# Patient Record
Sex: MALE | Race: WHITE | NOT HISPANIC OR LATINO
[De-identification: names, ages, dates, MRNs, and addresses within clinical notes are randomized per-mention and may not be internally consistent; named-entity substitution may affect disease eponyms.]

---

## 2017-04-28 ENCOUNTER — APPOINTMENT (OUTPATIENT)
Dept: NEPHROLOGY | Facility: CLINIC | Age: 62
End: 2017-04-28

## 2017-04-28 VITALS
WEIGHT: 180 LBS | DIASTOLIC BLOOD PRESSURE: 84 MMHG | SYSTOLIC BLOOD PRESSURE: 136 MMHG | HEART RATE: 76 BPM | BODY MASS INDEX: 24.38 KG/M2 | HEIGHT: 72 IN

## 2017-04-28 DIAGNOSIS — Z88.9 ALLERGY STATUS TO UNSPECIFIED DRUGS, MEDICAMENTS AND BIOLOGICAL SUBSTANCES: ICD-10-CM

## 2017-04-28 DIAGNOSIS — Z87.09 PERSONAL HISTORY OF OTHER DISEASES OF THE RESPIRATORY SYSTEM: ICD-10-CM

## 2017-04-28 DIAGNOSIS — N40.0 BENIGN PROSTATIC HYPERPLASIA WITHOUT LOWER URINARY TRACT SYMPMS: ICD-10-CM

## 2017-04-28 RX ORDER — ZOLPIDEM TARTRATE 10 MG/1
10 TABLET ORAL
Qty: 30 | Refills: 0 | Status: ACTIVE | COMMUNITY
Start: 2017-02-23

## 2017-04-28 RX ORDER — CETIRIZINE HYDROCHLORIDE 10 MG/1
10 TABLET, FILM COATED ORAL
Refills: 0 | Status: ACTIVE | COMMUNITY
Start: 2017-04-28

## 2017-04-28 RX ORDER — MONTELUKAST 10 MG/1
10 TABLET, FILM COATED ORAL
Qty: 30 | Refills: 0 | Status: ACTIVE | COMMUNITY
Start: 2016-07-15

## 2017-04-28 RX ORDER — ATORVASTATIN CALCIUM 20 MG/1
20 TABLET, FILM COATED ORAL
Qty: 30 | Refills: 0 | Status: ACTIVE | COMMUNITY
Start: 2017-03-21

## 2017-05-12 LAB
ALBUMIN SERPL ELPH-MCNC: 4.6 G/DL
ALP BLD-CCNC: 78 U/L
ALT SERPL-CCNC: 28 U/L
ANION GAP SERPL CALC-SCNC: 14 MMOL/L
APPEARANCE: CLEAR
AST SERPL-CCNC: 22 U/L
BACTERIA: NEGATIVE
BILIRUB SERPL-MCNC: 0.4 MG/DL
BILIRUBIN URINE: NEGATIVE
BLOOD URINE: NEGATIVE
BUN SERPL-MCNC: 13 MG/DL
CALCIUM SERPL-MCNC: 10.4 MG/DL
CHLORIDE SERPL-SCNC: 101 MMOL/L
CO2 SERPL-SCNC: 26 MMOL/L
COLOR: YELLOW
CORTIS SERPL-MCNC: 13 UG/DL
CREAT SERPL-MCNC: 0.93 MG/DL
CREAT SPEC-SCNC: 117 MG/DL
GLUCOSE QUALITATIVE U: NORMAL MG/DL
GLUCOSE SERPL-MCNC: 101 MG/DL
HYALINE CASTS: 0 /LPF
KETONES URINE: NEGATIVE
LEUKOCYTE ESTERASE URINE: NEGATIVE
MICROSCOPIC-UA: NORMAL
NITRITE URINE: NEGATIVE
OSMOLALITY UR: 700 MOS/KG
PH URINE: 6.5
POTASSIUM SERPL-SCNC: 5 MMOL/L
POTASSIUM UR-SCNC: 90 MMOL/L
PROT SERPL-MCNC: 7.6 G/DL
PROTEIN URINE: NEGATIVE MG/DL
RED BLOOD CELLS URINE: 1 /HPF
SODIUM SERPL-SCNC: 141 MMOL/L
SPECIFIC GRAVITY URINE: 1.02
SQUAMOUS EPITHELIAL CELLS: 0 /HPF
URATE SERPL-MCNC: 5.4 MG/DL
UROBILINOGEN URINE: NORMAL MG/DL
WHITE BLOOD CELLS URINE: 1 /HPF

## 2019-12-04 ENCOUNTER — RECORD ABSTRACTING (OUTPATIENT)
Age: 64
End: 2019-12-04

## 2019-12-04 DIAGNOSIS — Z82.3 FAMILY HISTORY OF STROKE: ICD-10-CM

## 2019-12-04 DIAGNOSIS — Z78.9 OTHER SPECIFIED HEALTH STATUS: ICD-10-CM

## 2019-12-04 LAB
PSA FREE FLD-MCNC: 16.5
PSA FREE SERPL-MCNC: 1.5
PSA SERPL-MCNC: 9.1

## 2020-01-09 ENCOUNTER — RX RENEWAL (OUTPATIENT)
Age: 65
End: 2020-01-09

## 2020-01-17 ENCOUNTER — APPOINTMENT (OUTPATIENT)
Dept: UROLOGY | Facility: CLINIC | Age: 65
End: 2020-01-17
Payer: COMMERCIAL

## 2020-01-17 VITALS
BODY MASS INDEX: 24.38 KG/M2 | HEART RATE: 96 BPM | HEIGHT: 72 IN | DIASTOLIC BLOOD PRESSURE: 87 MMHG | WEIGHT: 180 LBS | TEMPERATURE: 98 F | SYSTOLIC BLOOD PRESSURE: 138 MMHG

## 2020-01-17 DIAGNOSIS — Z00.00 ENCOUNTER FOR GENERAL ADULT MEDICAL EXAMINATION W/OUT ABNORMAL FINDINGS: ICD-10-CM

## 2020-01-17 DIAGNOSIS — E78.5 HYPERLIPIDEMIA, UNSPECIFIED: ICD-10-CM

## 2020-01-17 LAB
BILIRUB UR QL STRIP: NORMAL
CLARITY UR: CLEAR
COLLECTION METHOD: NORMAL
GLUCOSE UR-MCNC: NORMAL
HCG UR QL: 0.2 EU/DL
HGB UR QL STRIP.AUTO: NORMAL
KETONES UR-MCNC: NORMAL
LEUKOCYTE ESTERASE UR QL STRIP: NORMAL
NITRITE UR QL STRIP: NORMAL
PH UR STRIP: 5.5
PROT UR STRIP-MCNC: NORMAL
SP GR UR STRIP: 1.02

## 2020-01-17 PROCEDURE — 76857 US EXAM PELVIC LIMITED: CPT

## 2020-01-17 PROCEDURE — 81003 URINALYSIS AUTO W/O SCOPE: CPT | Mod: QW

## 2020-01-17 PROCEDURE — 99215 OFFICE O/P EST HI 40 MIN: CPT

## 2020-01-17 RX ORDER — FLUTICASONE FUROATE, UMECLIDINIUM BROMIDE AND VILANTEROL TRIFENATATE 100; 62.5; 25 UG/1; UG/1; UG/1
100-62.5-25 POWDER RESPIRATORY (INHALATION)
Refills: 0 | Status: ACTIVE | COMMUNITY

## 2020-01-17 RX ORDER — FLUTICASONE PROPIONATE AND SALMETEROL 50; 100 UG/1; UG/1
100-50 POWDER RESPIRATORY (INHALATION)
Qty: 60 | Refills: 0 | Status: DISCONTINUED | COMMUNITY
Start: 2016-08-26 | End: 2020-01-17

## 2020-01-22 LAB — PSA SERPL-MCNC: 14.8 NG/ML

## 2020-03-20 NOTE — PHYSICAL EXAM
[FreeTextEntry1] : Asymmetrical testes (left is approximately 10cc).  Tender, scarred left spermatic cord, consistent with prior surgery.

## 2020-03-20 NOTE — ASSESSMENT
[FreeTextEntry1] : I discussed the findings and options with . ANALIA GORDILLO in detail.  He will obtain another prostate MRI in view of his longstanding PSA elevation (albeit unchanged) and his family history of prostate cancer. I will also call him with the current PSA result.\par \par Regarding his voiding symptoms, we discussed other options (e.g. adding finasteride, surgical therapies), but he will simply continue on alfuzosin. Mr. Gordillo will decide if he wants to continue with daily Cialis.\par \par Finally, the ED will continue to be managed with Cialis 1/2 20mg prn.\par \par I look forward to seeing Mr. Gordillo in one year providing the pending test results are satisfactory.\par

## 2020-03-20 NOTE — HISTORY OF PRESENT ILLNESS
[FreeTextEntry1] : Mr. ANALIA GORDILLO comes in today for a urologic evaluation.  He presents with a several month onset of worsening LUTS (obstructive and irritative) and nocturia a x2. He has had rare episodes of urge incontinence.  He has been on alfuzosin since Dec 2017.  Three months ago he was started on daily Cialis.  In 2019 he underwent an evaluation of gross painless hematuria, which was negative. He has also had two prostate biopsies for elevated PSAs.\par IPSS:  22/35\par Sono:  67cc PVR; 47cc prostate\par \par He also has a several year onset of ED for which he uses Cialis 20mg prn.\par \par PSAs:  1/28/09--1.76; 2/11/10--3.89;  11/20/10--5.1; 6/8/11--6.02; 7/11/12--5.5; 4/25/14--3.02; 4/13/15--3.85; 12/10/15--14.7; 1/19/17--8.05; 12/7/17--9.7; 5/19/18--8.9 (17%); 12/6/18--9.2 (15%)\par \par Prostate bxs: 6/19/10--BPH; 2/19/16--BPH \par \par MRI prostate:  12/22/17--PIRADS 2\par \par CT abd/pelvis:  1/16/19--sigmoid diverticulosis\par Cysto: 2/1/19--No significant pathology

## 2020-07-07 ENCOUNTER — APPOINTMENT (OUTPATIENT)
Dept: UROLOGY | Facility: CLINIC | Age: 65
End: 2020-07-07
Payer: COMMERCIAL

## 2020-07-07 VITALS
WEIGHT: 180 LBS | DIASTOLIC BLOOD PRESSURE: 82 MMHG | HEIGHT: 72 IN | TEMPERATURE: 98.2 F | SYSTOLIC BLOOD PRESSURE: 136 MMHG | BODY MASS INDEX: 24.38 KG/M2

## 2020-07-07 LAB
BILIRUB UR QL STRIP: NORMAL
CLARITY UR: CLEAR
COLLECTION METHOD: NORMAL
GLUCOSE UR-MCNC: NORMAL
HCG UR QL: 0.2 EU/DL
HGB UR QL STRIP.AUTO: NORMAL
KETONES UR-MCNC: NORMAL
LEUKOCYTE ESTERASE UR QL STRIP: NORMAL
NITRITE UR QL STRIP: NORMAL
PH UR STRIP: 5.5
PROT UR STRIP-MCNC: NORMAL
SP GR UR STRIP: 1.03

## 2020-07-07 PROCEDURE — 76857 US EXAM PELVIC LIMITED: CPT

## 2020-07-07 PROCEDURE — 81003 URINALYSIS AUTO W/O SCOPE: CPT | Mod: QW

## 2020-07-07 PROCEDURE — 99215 OFFICE O/P EST HI 40 MIN: CPT | Mod: 25

## 2020-07-07 NOTE — PHYSICAL EXAM
[General Appearance - Well Nourished] : well nourished [Normal Appearance] : normal appearance [General Appearance - Well Developed] : well developed [Abdomen Soft] : soft [General Appearance - In No Acute Distress] : no acute distress [Well Groomed] : well groomed [Abdomen Tenderness] : non-tender [Abdomen Mass (___ Cm)] : no abdominal mass palpated [Costovertebral Angle Tenderness] : no ~M costovertebral angle tenderness [Abdomen Hernia] : no hernia was discovered [Penis Abnormality] : normal circumcised penis [Urethral Meatus] : meatus normal [Scrotum] : the scrotum was normal [Urinary Bladder Findings] : the bladder was normal on palpation [Testes Mass (___cm)] : there were no testicular masses [Testes Tenderness] : no tenderness of the testes [No Prostate Nodules] : no prostate nodules [Skin Color & Pigmentation] : normal skin color and pigmentation [Edema] : no peripheral edema [Heart Rate And Rhythm] : Heart rate and rhythm were normal [Exaggerated Use Of Accessory Muscles For Inspiration] : no accessory muscle use [] : no respiratory distress [Respiration, Rhythm And Depth] : normal respiratory rhythm and effort [Oriented To Time, Place, And Person] : oriented to person, place, and time [Affect] : the affect was normal [Mood] : the mood was normal [Not Anxious] : not anxious [Normal Station and Gait] : the gait and station were normal for the patient's age [No Focal Deficits] : no focal deficits [No Palpable Adenopathy] : no palpable adenopathy [FreeTextEntry1] : Asymmetrical testes (left is approximately 10cc).  Tender, scarred left spermatic cord, consistent with prior surgery.

## 2020-07-07 NOTE — ASSESSMENT
[FreeTextEntry1] : I discussed the findings and options with . ANALIA GORDILLO in detail.\par He will decide if he wants to proceed with another prostate biopsy, as advised because of the persistently elevated PSA and abnormal PSAD.  Although the multi-parametric prostate MRI is without any evident lesions, since this will be his third biopsy, I recommended that he have this done transperineally in order to access the central zone.  If he decides to proceed with this, I referred him to Dr. Espinoza in our office.  I again reviewed the prostate biopsy procedures with him including the risks and benefits.

## 2020-07-07 NOTE — HISTORY OF PRESENT ILLNESS
[FreeTextEntry1] : Mr. ANALIA GORDILLO comes in today for a urologic followup.  He presents with a several month onset of worsening LUTS (obstructive and irritative) and nocturia a x2. He also has rare episodes of urge incontinence.  He is currently on alfuzosin and daily Cialis. In 2019 he underwent an evaluation for gross painless hematuria, which was negative. He has also had two prostate biopsies for elevated PSAs (see below)\par IPSS: 19/35\par Sono:  32cc PVR; 56cc prostate\par (PSAD: 0.195)\par \par Mr. Gordillo has a several year onset of ED for which he uses Cialis 20mg prn.\par \par PSAs:  1/28/09--1.76; 2/11/10--3.89;  11/20/10--5.1; 6/8/11--6.02; 7/11/12--5.5; 4/25/14--3.02; 4/13/15--3.85; 12/10/15--14.7; 1/19/17--8.05; 12/7/17--9.7; 5/19/18--8.9 (17%); 12/6/18--9.2 (15%); 1/17/20--14.8; 3/14/20--14.7; 6/24/20--12.7\par \par Prostate bxs: 6/19/10--BPH; 2/19/16--BPH \par \par MRI prostate:  12/22/17--PIRADS 2; 6/11/20--Normal study (65cc prostate)\par \par CT abd/pelvis:  1/16/19--sigmoid diverticulosis\par Cysto: 2/1/19--No significant pathology

## 2020-10-19 ENCOUNTER — NON-APPOINTMENT (OUTPATIENT)
Age: 65
End: 2020-10-19

## 2020-12-28 ENCOUNTER — APPOINTMENT (OUTPATIENT)
Dept: UROLOGY | Facility: CLINIC | Age: 65
End: 2020-12-28
Payer: COMMERCIAL

## 2020-12-28 VITALS
SYSTOLIC BLOOD PRESSURE: 119 MMHG | TEMPERATURE: 98 F | OXYGEN SATURATION: 98 % | DIASTOLIC BLOOD PRESSURE: 85 MMHG | HEART RATE: 88 BPM

## 2020-12-28 DIAGNOSIS — J45.909 UNSPECIFIED ASTHMA, UNCOMPLICATED: ICD-10-CM

## 2020-12-28 LAB
BILIRUB UR QL STRIP: NORMAL
CLARITY UR: CLEAR
COLLECTION METHOD: NORMAL
GLUCOSE UR-MCNC: NORMAL
HCG UR QL: 0.2 EU/DL
HGB UR QL STRIP.AUTO: NORMAL
KETONES UR-MCNC: NORMAL
LEUKOCYTE ESTERASE UR QL STRIP: NORMAL
NITRITE UR QL STRIP: NORMAL
PH UR STRIP: 5.5
PROT UR STRIP-MCNC: NORMAL
SP GR UR STRIP: 1.01

## 2020-12-28 PROCEDURE — 76857 US EXAM PELVIC LIMITED: CPT

## 2020-12-28 PROCEDURE — 99072 ADDL SUPL MATRL&STAF TM PHE: CPT

## 2020-12-28 PROCEDURE — 99214 OFFICE O/P EST MOD 30 MIN: CPT | Mod: 25

## 2020-12-28 PROCEDURE — 81003 URINALYSIS AUTO W/O SCOPE: CPT | Mod: QW

## 2020-12-28 RX ORDER — TADALAFIL 5 MG/1
TABLET, FILM COATED ORAL
Refills: 0 | Status: DISCONTINUED | COMMUNITY
End: 2020-12-28

## 2020-12-28 NOTE — HISTORY OF PRESENT ILLNESS
[FreeTextEntry1] : Mr. ANALIA GORDILLO comes in today for a urologic followup.  He presents with moderate relatively stable urinary tract symptoms (obstructive and irritative) and nocturia x 2. He also has rare instances of urge incontinence. Approximately 10 days ago he experienced a 2 day episode of increasing obstructive-type symptoms, which resolved spontaneously.  He reports a similar episode one year ago--both possibly related to increased alcohol intake.  Mr. Gordillo is continuing on alfuzosin and daily Cialis.\par IPSS: 22/35\par Sono: 25cc PVR; 66cc prostate\par \par In 2019 he underwent an evaluation for gross painless hematuria, which was negative. He has also had two prostate biopsies for elevated PSAs (see below).\par \par Mr. Gordillo has a several year onset of ED for which he uses Cialis 20mg prn.\par \par PSAs:  1/28/09--1.76; 2/11/10--3.89;  11/20/10--5.1; 6/8/11--6.02; 7/11/12--5.5; 4/25/14--3.02; 4/13/15--3.85; 12/10/15--14.7; 1/19/17--8.05; 12/7/17--9.7; 5/19/18--8.9 (17%); 12/6/18--9.2 (15%); 1/17/20--14.8; 3/14/20--14.7; 6/24/20--12.7; 10/2/20--11.3 (PSAD:  0.17)\par \par Prostate bxs: 2/19/16--BPH; 6/19/10--BPH \par \par MRI prostate:  6/11/20--Normal study (65cc prostate); 12/22/17--PIRADS 2 \par \par CT abd/pelvis:  1/16/19--sigmoid diverticulosis\par Cysto: 2/1/19--No significant pathology

## 2020-12-28 NOTE — ASSESSMENT
[FreeTextEntry1] : I discussed the findings and options with Mr. ANALIA GORDILLO in detail.\par He does not want any additional pharmacologic intervention (i.e. a 5-alpha reductase inhibitor) but will continue on  alfuzosin and daily Cialis for his voiding symptoms.\par \par Similarly, Mr. Gordillo wants to forego another prostate biopsy at this time.\par \par Regarding the erectile dysfunction, he will continue with Cialis 20 mg as needed.\par \par Providing the PSA remains stable I would like to see Mr. Gordillo in 6 months. (bladder sono, PSA).\par

## 2020-12-30 ENCOUNTER — NON-APPOINTMENT (OUTPATIENT)
Age: 65
End: 2020-12-30

## 2020-12-30 LAB — PSA SERPL-MCNC: 15.4 NG/ML

## 2021-05-05 ENCOUNTER — RX RENEWAL (OUTPATIENT)
Age: 66
End: 2021-05-05

## 2021-06-04 ENCOUNTER — RX RENEWAL (OUTPATIENT)
Age: 66
End: 2021-06-04

## 2021-06-24 ENCOUNTER — NON-APPOINTMENT (OUTPATIENT)
Age: 66
End: 2021-06-24

## 2021-07-02 ENCOUNTER — RX RENEWAL (OUTPATIENT)
Age: 66
End: 2021-07-02

## 2021-07-12 ENCOUNTER — APPOINTMENT (OUTPATIENT)
Dept: UROLOGY | Facility: CLINIC | Age: 66
End: 2021-07-12
Payer: COMMERCIAL

## 2021-07-12 PROCEDURE — 76857 US EXAM PELVIC LIMITED: CPT

## 2021-07-12 PROCEDURE — 99072 ADDL SUPL MATRL&STAF TM PHE: CPT

## 2021-07-12 PROCEDURE — 99215 OFFICE O/P EST HI 40 MIN: CPT

## 2021-07-12 NOTE — HISTORY OF PRESENT ILLNESS
[FreeTextEntry1] : Mr. ANALIA GORDILLO comes in today for a urologic followup.  Approximately 3 weeks ago he noted gross painless hematuria noted on two urinations. In 2019 he underwent an evaluation for gross painless hematuria, which was negative. He has also had two prostate biopsies for elevated PSAs (see below).\par \par From his general urologic history, Mr. Gordillo reports moderate relatively stable urinary tract symptoms (obstructive and irritative) and nocturia x 2. He also has rare instances of urge incontinence. Approximately 10 days ago he experienced a 2 day episode of increasing obstructive-type symptoms, which resolved spontaneously.  He reports a similar episode one year ago--both possibly related to increased alcohol intake.  Mr. Gordillo is continuing on alfuzosin and daily Cialis.\par IPSS: 20/35\par Sono: 103cc PVR; 74cc prostate\par \par Mr. Gordillo has a several year onset of ED for which he uses Cialis 20mg prn.\par \par PSAs:  1/28/09--1.76; 2/11/10--3.89;  11/20/10--5.1; 6/8/11--6.02; 7/11/12--5.5; 4/25/14--3.02; 4/13/15--3.85; 12/10/15--14.7; 1/19/17--8.05; 12/7/17--9.7; 5/19/18--8.9 (17%); 12/6/18--9.2 (15%); 1/17/20--14.8; 3/14/20--14.7; 6/24/20--12.7; 10/2/20--11.3 (PSAD:  0.17); 5/26/21--14.5; \par \par Prostate bxs: 2/19/16--BPH; 6/19/10--BPH \par \par MRI prostate:  6/11/20--Normal study (65cc prostate); 12/22/17--PIRADS 2 \par \par CT abd/pelvis:  1/16/19--sigmoid diverticulosis\par Cysto: 2/1/19--No significant pathology

## 2021-07-12 NOTE — ASSESSMENT
[FreeTextEntry1] : I discussed the findings and options with . ANALIA GORDILLO in detail. He will proceed with a CT abd/pelvis and return for an in-office cystoscopy. \par \par Regarding his voding symptoms, he will continue with daily alfuzosin and Cialis (5mg).  Additionally, he will use 20mg Cialis prn.\par \par I would like Mr. Gordillo to repeat a PSA in 4 months and I will call him with that result.

## 2021-07-12 NOTE — PHYSICAL EXAM
[General Appearance - Well Developed] : well developed [General Appearance - Well Nourished] : well nourished [Normal Appearance] : normal appearance [Well Groomed] : well groomed [General Appearance - In No Acute Distress] : no acute distress [Abdomen Soft] : soft [Abdomen Tenderness] : non-tender [Abdomen Mass (___ Cm)] : no abdominal mass palpated [Abdomen Hernia] : no hernia was discovered [Costovertebral Angle Tenderness] : no ~M costovertebral angle tenderness [Urethral Meatus] : meatus normal [Penis Abnormality] : normal circumcised penis [Urinary Bladder Findings] : the bladder was normal on palpation [Scrotum] : the scrotum was normal [Testes Tenderness] : no tenderness of the testes [Testes Mass (___cm)] : there were no testicular masses [No Prostate Nodules] : no prostate nodules [Skin Color & Pigmentation] : normal skin color and pigmentation [Heart Rate And Rhythm] : Heart rate and rhythm were normal [Edema] : no peripheral edema [] : no respiratory distress [Respiration, Rhythm And Depth] : normal respiratory rhythm and effort [Exaggerated Use Of Accessory Muscles For Inspiration] : no accessory muscle use [Oriented To Time, Place, And Person] : oriented to person, place, and time [Affect] : the affect was normal [Mood] : the mood was normal [Not Anxious] : not anxious [Normal Station and Gait] : the gait and station were normal for the patient's age [No Focal Deficits] : no focal deficits [No Palpable Adenopathy] : no palpable adenopathy [Prostate Tenderness] : the prostate was not tender [FreeTextEntry1] : Asymmetrical testes (left is approximately 5-10cc).  Tender, scarred left spermatic cord, consistent with prior surgery.

## 2021-07-12 NOTE — ADDENDUM
[FreeTextEntry1] : A portion of this note was written by [Christiano Healy] on 07/09/2021 acting as a scribe for Dr. Elias. \par \par I have personally reviewed the chart and agree that the record accurately reflects my personal performance of the history, physical exam, assessment, and plan.

## 2021-07-13 LAB — URINE CYTOLOGY: NORMAL

## 2021-07-14 LAB — BACTERIA UR CULT: NORMAL

## 2021-07-15 ENCOUNTER — APPOINTMENT (OUTPATIENT)
Dept: UROLOGY | Facility: CLINIC | Age: 66
End: 2021-07-15
Payer: COMMERCIAL

## 2021-07-15 PROCEDURE — 99214 OFFICE O/P EST MOD 30 MIN: CPT | Mod: 25

## 2021-07-15 PROCEDURE — 52000 CYSTOURETHROSCOPY: CPT

## 2021-07-15 NOTE — ADDENDUM
[FreeTextEntry1] : A portion of this note was written by [Christiano Healy] on 07/14/2021 acting as a scribe for Dr. Elias. \par \par I have personally reviewed the chart and agree that the record accurately reflects my personal performance of the history, physical exam, assessment, and plan.

## 2021-07-15 NOTE — HISTORY OF PRESENT ILLNESS
[FreeTextEntry1] : Mr. ANALIA GORDILLO comes in today for a urologic followup and is scheduled for a cystoscopy for evaluation of gross painless hematuria noted on two urinations approximately 3 weeks ago.  He denies renal colic.\par \par From his general urologic history, Mr. Gordillo reports moderate relatively stable urinary tract symptoms (obstructive and irritative) and nocturia x 2. He also has rare instances of urge incontinence. Approximately 10 days ago he experienced a 2 day episode of increasing obstructive-type symptoms, which resolved spontaneously.  He reports a similar episode one year ago--both possibly related to increased alcohol intake.  Mr. Goridllo is continuing on alfuzosin and daily Cialis.\par \par Mr. Gordillo has a several year onset of ED for which he uses Cialis 20mg prn.\par \par PSAs:  1/28/09--1.76; 2/11/10--3.89;  11/20/10--5.1; 6/8/11--6.02; 7/11/12--5.5; 4/25/14--3.02; 4/13/15--3.85; 12/10/15--14.7; 1/19/17--8.05; 12/7/17--9.7; 5/19/18--8.9 (17%); 12/6/18--9.2 (15%); 1/17/20--14.8; 3/14/20--14.7; 6/24/20--12.7; 10/2/20--11.3 (PSAD:  0.17); 5/26/21--14.5; \par \par Prostate bxs: 2/19/16--BPH; 6/19/10--BPH \par \par MRI prostate:  6/11/20--Normal study (65cc prostate); 12/22/17--PIRADS 2 \par \par CT abd/pelvis:  7/13/21--Punctate right renal calculus measuring 2mm. Hepatic cyst; 1/16/19--sigmoid diverticulosis\par Cysto: 2/1/19--No significant pathology

## 2021-07-15 NOTE — PHYSICAL EXAM
[General Appearance - Well Developed] : well developed [General Appearance - Well Nourished] : well nourished [Normal Appearance] : normal appearance [Well Groomed] : well groomed [General Appearance - In No Acute Distress] : no acute distress [Abdomen Soft] : soft [Abdomen Tenderness] : non-tender [Abdomen Mass (___ Cm)] : no abdominal mass palpated [Urethral Meatus] : meatus normal [Penis Abnormality] : normal circumcised penis [Urinary Bladder Findings] : the bladder was normal on palpation [Scrotum] : the scrotum was normal [] : no respiratory distress [Respiration, Rhythm And Depth] : normal respiratory rhythm and effort [Exaggerated Use Of Accessory Muscles For Inspiration] : no accessory muscle use [Oriented To Time, Place, And Person] : oriented to person, place, and time [Affect] : the affect was normal [Mood] : the mood was normal [Not Anxious] : not anxious [Normal Station and Gait] : the gait and station were normal for the patient's age [Testes Tenderness] : no tenderness of the testes [Testes Mass (___cm)] : there were no testicular masses [FreeTextEntry1] : Asymmetrical testes (left is approximately 5-10cc).  Tender, scarred left spermatic cord, consistent with prior surgery.

## 2021-07-15 NOTE — ASSESSMENT
[FreeTextEntry1] : I discussed the findings and options with . ANALIA GORDILLO in detail and reviewed the CT results with him.  Fortunately, today's cystoscopy was negative with the likely etiology of the bleeding.  If this persists, we can consider adding finasteride to the daily alfuzosin and Cialis.\par \par Going forward, I recommended that he return electively in 1 year (bladder and renal sonograms, PSA).\par \par I would also like Mr. Gordillo to repeat a PSA in 4 months and I will call him with that result.

## 2021-07-19 LAB — BACTERIA UR CULT: NORMAL

## 2021-09-17 ENCOUNTER — NON-APPOINTMENT (OUTPATIENT)
Age: 66
End: 2021-09-17

## 2021-10-14 ENCOUNTER — NON-APPOINTMENT (OUTPATIENT)
Age: 66
End: 2021-10-14

## 2021-10-26 ENCOUNTER — APPOINTMENT (OUTPATIENT)
Dept: UROLOGY | Facility: CLINIC | Age: 66
End: 2021-10-26
Payer: COMMERCIAL

## 2021-10-26 VITALS — SYSTOLIC BLOOD PRESSURE: 124 MMHG | HEART RATE: 114 BPM | TEMPERATURE: 98 F | DIASTOLIC BLOOD PRESSURE: 79 MMHG

## 2021-10-26 PROCEDURE — 99214 OFFICE O/P EST MOD 30 MIN: CPT

## 2021-10-26 NOTE — ASSESSMENT
[FreeTextEntry1] : 66 year old with family history of prostate cancer, elevated PSA up to 16 in 9/2021, two negative prior prostate biopsies and multiple negative prostate MRIs. His most recent MRI in 10/2021 showed a 86 cc prostate with no concerning lesions. \par \par Prostate cancer screening: the patient and I spoke at length about prostate cancer screening, its risks and its benefits. The patient has several risk factors for prostate cancer, including elevated PSA and strong family history.  He understands that many men with prostate cancer will die with the disease rather than of it and we also discussed the results large multi-center [place] and  prostate cancer screening trials. He also understands that PSA in and of itself does not diagnose prostate cancer but only assesses risk to a certain degree. The patient understands that to definitively screen for prostate cancer, a biopsy is required and this procedure has risks, including bleeding, infection, ED and urinary retention.  The patient opted to move forward with the biopsy, which is performed via a transperineal approach in the office.\par \par The patient is aware to expect hematuria x 2 weeks and upto 4 weeks of hematospermia.  There is a risk of infection albeit much lower than a transrectal approach. In some cases patients can experience erectile dysfunction but this is usually self limiting.  Any fever/chills after the biopsy the patient is to contact the office and go to the ER for an immediate evaluation. He has been given paper instructions outlining these items - which includes medications to avoid prior to surgery.\par \par 1. TP prostate biopsy in the office\par 2. Enema the night before\par

## 2021-10-26 NOTE — HISTORY OF PRESENT ILLNESS
[FreeTextEntry1] : Mr. ANALIA GORDILLO comes in today to discuss prostate biopsy. \par \par From his general urologic history, Mr. Gordillo reports moderate relatively stable urinary tract symptoms (obstructive and irritative) and nocturia x 2. He also has rare instances of urge incontinence.  Mr. Gordillo is continuing on alfuzosin and daily Cialis.\par \par Mr. Gordillo has a several year onset of ED for which he uses Cialis 20mg prn.\par \par PSAs: 1/28/09--1.76; 2/11/10--3.89; 11/20/10--5.1; 6/8/11--6.02; 7/11/12--5.5; 4/25/14--3.02; 4/13/15--3.85; 12/10/15--14.7; 1/19/17--8.05; 12/7/17--9.7; 5/19/18--8.9 (17%); 12/6/18--9.2 (15%); 1/17/20--14.8; 3/14/20--14.7; 6/24/20--12.7; 10/2/20--11.3 (PSAD: 0.17); 5/26/21--14.5; 9/17/21--16.04\par \par Prostate bxs: 2/19/16--BPH; 6/19/10--BPH \par \par MRI prostate: 6/11/20--Normal study (65cc prostate); 12/22/17--PIRADS 2 \par 10/8/21: 86 cc prostate, no concerning lesions\par \par CT abd/pelvis: 7/13/21--Punctate right renal calculus measuring 2mm. Hepatic cyst; 1/16/19--sigmoid diverticulosis\par Cysto: 2/1/19--No significant pathology

## 2021-11-08 ENCOUNTER — APPOINTMENT (OUTPATIENT)
Dept: UROLOGY | Facility: CLINIC | Age: 66
End: 2021-11-08
Payer: COMMERCIAL

## 2021-11-08 VITALS — SYSTOLIC BLOOD PRESSURE: 152 MMHG | TEMPERATURE: 98 F | DIASTOLIC BLOOD PRESSURE: 70 MMHG | HEART RATE: 59 BPM

## 2021-11-08 PROCEDURE — 76872 US TRANSRECTAL: CPT

## 2021-11-08 PROCEDURE — 55700: CPT

## 2021-11-19 LAB — CORE LAB BIOPSY: NORMAL

## 2022-01-10 ENCOUNTER — APPOINTMENT (OUTPATIENT)
Dept: UROLOGY | Facility: CLINIC | Age: 67
End: 2022-01-10

## 2022-02-14 ENCOUNTER — APPOINTMENT (OUTPATIENT)
Dept: UROLOGY | Facility: CLINIC | Age: 67
End: 2022-02-14
Payer: COMMERCIAL

## 2022-02-14 VITALS
WEIGHT: 180 LBS | TEMPERATURE: 98.2 F | SYSTOLIC BLOOD PRESSURE: 151 MMHG | HEART RATE: 97 BPM | HEIGHT: 72 IN | DIASTOLIC BLOOD PRESSURE: 85 MMHG | RESPIRATION RATE: 14 BRPM | BODY MASS INDEX: 24.38 KG/M2

## 2022-02-14 DIAGNOSIS — Z87.448 PERSONAL HISTORY OF OTHER DISEASES OF URINARY SYSTEM: ICD-10-CM

## 2022-02-14 LAB
BILIRUB UR QL STRIP: NORMAL
CLARITY UR: CLEAR
COLLECTION METHOD: NORMAL
GLUCOSE UR-MCNC: NORMAL
HCG UR QL: 0.2 EU/DL
HGB UR QL STRIP.AUTO: NORMAL
KETONES UR-MCNC: NORMAL
LEUKOCYTE ESTERASE UR QL STRIP: NORMAL
NITRITE UR QL STRIP: NORMAL
PH UR STRIP: 5.5
PROT UR STRIP-MCNC: NORMAL
SP GR UR STRIP: >1.03

## 2022-02-14 PROCEDURE — 81003 URINALYSIS AUTO W/O SCOPE: CPT | Mod: QW

## 2022-02-14 PROCEDURE — 76857 US EXAM PELVIC LIMITED: CPT

## 2022-02-14 PROCEDURE — 99215 OFFICE O/P EST HI 40 MIN: CPT

## 2022-02-15 ENCOUNTER — NON-APPOINTMENT (OUTPATIENT)
Age: 67
End: 2022-02-15

## 2022-02-15 LAB — PSA SERPL-MCNC: 17.5 NG/ML

## 2022-02-15 NOTE — ASSESSMENT
[FreeTextEntry1] : I discussed the findings and options with Mr. ANALIA GORDILLO in detail.  He does not want any additional pharmacologic (e.g. 5aRI) or any surgical intervention for his stable voiding symptoms.  I discussed the various prostatic reductive surgical procedures with him (including the gold standard TURP, laser prostatectomy [HoLEP, Greenlight], urethral lift [Uro-Lift], water vapor thermal therapy [Rezum], aquablation [AquaBeam Robotic System]). For now, he will continue with alfuzosin and daily Cialis.\par \par Mr. Gordillo will also keep using Cialis 20mg prn for his ED, which is well controlled.\par \par Providing the PSA remains stable, I look forward to seeing Mr. Gordillo in one year (bladder sono, PSA, renal sono).

## 2022-02-15 NOTE — PHYSICAL EXAM
[Abdomen Hernia] : no hernia was discovered [Costovertebral Angle Tenderness] : no ~M costovertebral angle tenderness [Prostate Tenderness] : the prostate was not tender [No Prostate Nodules] : no prostate nodules [Skin Color & Pigmentation] : normal skin color and pigmentation [Heart Rate And Rhythm] : Heart rate and rhythm were normal [No Focal Deficits] : no focal deficits [No Palpable Adenopathy] : no palpable adenopathy [FreeTextEntry1] : Asymmetrical testes (left is approximately 5-10cc).  Tender, scarred left spermatic cord, consistent with prior surgery.

## 2022-02-15 NOTE — ADDENDUM
[FreeTextEntry1] : A portion of this note was written by [Christiano Healy] on 01/03/2022 acting as a scribe for Dr. Elias. \par \par I have personally reviewed the chart and agree that the record accurately reflects my personal performance of the history, physical exam, assessment, and plan.

## 2022-02-15 NOTE — HISTORY OF PRESENT ILLNESS
[FreeTextEntry1] : Mr. ANALIA GORDILLO comes in today for a urologic followup. A transperineal prostate biopsy performed  on October 26, 2021 was negative (see below). He has had several episodes of hematospermia, since.\par \par From his general urologic history, Mr. Gordillo reports moderate relatively stable urinary tract symptoms (obstructive and irritative) and nocturia x 2. He also has rare instances of urge incontinence.  He has had a few episodes of transient increases in the obstructive component of his urination, which resolved spontaneously. He is continuing on alfuzosin and daily Cialis 5mg.\par IPSS: 22/35\par Sono:  69cc PVR; 95cc prostate\par \par Mr. Goridllo has a several year onset of ED for which he uses Cialis 20mg prn.\par \par He was found to have urolithiasis on a routine CT, but has remained asymptomatic.\par \par PSAs:  1/28/09--1.76; 2/11/10--3.89;  11/20/10--5.1; 6/8/11--6.02; 7/11/12--5.5; 4/25/14--3.02; 4/13/15--3.85; 12/10/15--14.7; 1/19/17--8.05; 12/7/17--9.7; 5/19/18--8.9 (17%); 12/6/18--9.2 (15%); 1/17/20--14.8; 3/14/20--14.7; 6/24/20--12.7; 10/2/20--11.3 (PSAD:  0.17); 5/26/21--14.5; \par \par Prostate bxs: 11/19/21--BPH; 2/19/16--BPH; 6/19/10--BPH \par \par MRI prostate:  6/11/20--Normal study (65cc prostate); 12/22/17--PIRADS 2 \par \par CT abd/pelvis:  7/13/21--Punctate right renal calculus measuring 2mm. Hepatic cyst; 1/16/19--sigmoid diverticulosis\par Cysto: 2/1/19--No significant pathology

## 2022-05-04 ENCOUNTER — NON-APPOINTMENT (OUTPATIENT)
Age: 67
End: 2022-05-04

## 2022-05-05 ENCOUNTER — NON-APPOINTMENT (OUTPATIENT)
Age: 67
End: 2022-05-05

## 2022-05-09 ENCOUNTER — APPOINTMENT (OUTPATIENT)
Dept: UROLOGY | Facility: CLINIC | Age: 67
End: 2022-05-09
Payer: COMMERCIAL

## 2022-05-09 VITALS
HEART RATE: 80 BPM | BODY MASS INDEX: 24.38 KG/M2 | DIASTOLIC BLOOD PRESSURE: 80 MMHG | RESPIRATION RATE: 18 BRPM | TEMPERATURE: 98.2 F | SYSTOLIC BLOOD PRESSURE: 130 MMHG | WEIGHT: 180 LBS | HEIGHT: 72 IN

## 2022-05-09 LAB
BILIRUB UR QL STRIP: NORMAL
CLARITY UR: CLEAR
COLLECTION METHOD: NORMAL
GLUCOSE UR-MCNC: NORMAL
HCG UR QL: 0.2 EU/DL
HGB UR QL STRIP.AUTO: NORMAL
KETONES UR-MCNC: NORMAL
LEUKOCYTE ESTERASE UR QL STRIP: NORMAL
NITRITE UR QL STRIP: NORMAL
PH UR STRIP: 7
PROT UR STRIP-MCNC: NORMAL
SP GR UR STRIP: 1.02

## 2022-05-09 PROCEDURE — 81003 URINALYSIS AUTO W/O SCOPE: CPT | Mod: QW

## 2022-05-09 PROCEDURE — 76857 US EXAM PELVIC LIMITED: CPT

## 2022-05-09 PROCEDURE — 99214 OFFICE O/P EST MOD 30 MIN: CPT

## 2022-05-09 NOTE — PHYSICAL EXAM
[General Appearance - Well Developed] : well developed [General Appearance - Well Nourished] : well nourished [Normal Appearance] : normal appearance [Well Groomed] : well groomed [General Appearance - In No Acute Distress] : no acute distress [Abdomen Soft] : soft [Abdomen Tenderness] : non-tender [Abdomen Mass (___ Cm)] : no abdominal mass palpated [Abdomen Hernia] : no hernia was discovered [Costovertebral Angle Tenderness] : no ~M costovertebral angle tenderness [Urethral Meatus] : meatus normal [Penis Abnormality] : normal circumcised penis [Urinary Bladder Findings] : the bladder was normal on palpation [Scrotum] : the scrotum was normal [Testes Tenderness] : no tenderness of the testes [Testes Mass (___cm)] : there were no testicular masses [Prostate Tenderness] : the prostate was not tender [No Prostate Nodules] : no prostate nodules [Skin Color & Pigmentation] : normal skin color and pigmentation [Heart Rate And Rhythm] : Heart rate and rhythm were normal [] : no respiratory distress [Respiration, Rhythm And Depth] : normal respiratory rhythm and effort [Exaggerated Use Of Accessory Muscles For Inspiration] : no accessory muscle use [Oriented To Time, Place, And Person] : oriented to person, place, and time [Affect] : the affect was normal [Not Anxious] : not anxious [Mood] : the mood was normal [Normal Station and Gait] : the gait and station were normal for the patient's age [No Focal Deficits] : no focal deficits [No Palpable Adenopathy] : no palpable adenopathy [FreeTextEntry1] : Asymmetrical testes (left is approximately 5-10cc).  Tender, scarred left spermatic cord, consistent with prior surgery.

## 2022-05-09 NOTE — HISTORY OF PRESENT ILLNESS
[FreeTextEntry1] : Mr. ANALIA GORDILLO comes in today for a urologic followup.  For the past month he had been experiencing an exacerbation of the obstructive component of his voiding symptoms, which seems to have resolved.  He is now back to his baseline urination and awakens 2x at night to void. He has had similar exacerbations in the past, lasting only several days. He is continuing on alfuzosin and daily Cialis 5mg.\par IPSS: 26/35\par Sono (performed to assess bladder emptying): 122cc PVR; 103cc prostate\par (69cc PVR Feb 2022)\par \par He has longstanding PSA elevations, with  3 negative prostate biopsies. Since the last biopsy in October 2021, he has had recurrent hematospermia. \par \par Mr. Gordillo has a several year onset of ED for which he uses Cialis 20mg prn.\par \par He was found to have urolithiasis on a routine CT, but has remained asymptomatic.\par \par PSAs:  1/28/09--1.76; 2/11/10--3.89;  11/20/10--5.1; 6/8/11--6.02; 7/11/12--5.5; 4/25/14--3.02; 4/13/15--3.85; 12/10/15--14.7; 1/19/17--8.05; 12/7/17--9.7; 5/19/18--8.9 (17%); 12/6/18--9.2 (15%); 1/17/20--14.8; 3/14/20--14.7; 6/24/20--12.7; 10/2/20--11.3 (PSAD:  0.17); 5/26/21--14.5; 2/14/22--17.5; 4/4/22--14.37\par \par Prostate bxs: 11/19/21--BPH; 2/19/16--BPH; 6/19/10--BPH \par \par MRI prostate: 10/8/21--PIRADS 2. 86cc prostate;  6/11/20--Normal study (65cc prostate); 12/22/17--PIRADS 2 \par \par CT abd/pelvis:  7/13/21--Punctate right renal calculus measuring 2mm. Hepatic cyst; 1/16/19--sigmoid diverticulosis\par Cysto: 2/1/19--No significant pathology

## 2022-05-09 NOTE — ASSESSMENT
[FreeTextEntry1] : I discussed the findings and options with Mr. ANALIA GORDILLO in detail.  Because of the spontaneous improvement in his urination, Mr. Gordillo does not want to consider adding a 5aRI or any surgical intervention at this time.  He may try increasing the alfuzosin to bid, while maintaining the daily Cialis.  He should monitor his BP when he is on the higher a1 blocker dose. \par \par Mr. Gordillo will also keep using Cialis 20mg prn for his ED, which is well controlled.\par \par We agreed that he would repeat a PSA in 4 months and return, electively, in 6 months (bladder sono, ? PSA).

## 2022-10-17 ENCOUNTER — APPOINTMENT (OUTPATIENT)
Dept: UROLOGY | Facility: CLINIC | Age: 67
End: 2022-10-17

## 2022-10-17 PROCEDURE — 99214 OFFICE O/P EST MOD 30 MIN: CPT | Mod: 25

## 2022-10-17 PROCEDURE — 81003 URINALYSIS AUTO W/O SCOPE: CPT | Mod: QW

## 2022-10-17 PROCEDURE — 76857 US EXAM PELVIC LIMITED: CPT

## 2022-10-17 RX ORDER — ALFUZOSIN HYDROCHLORIDE 10 MG/1
10 TABLET, EXTENDED RELEASE ORAL TWICE DAILY
Qty: 180 | Refills: 3 | Status: ACTIVE | COMMUNITY
Start: 2020-01-09 | End: 1900-01-01

## 2022-10-17 NOTE — ADDENDUM
[FreeTextEntry1] : A portion of this note was written by [Christiano Healy] on 10/13/2022 acting as a scribe for Dr. Elias. \par \par I have personally reviewed the chart and agree that the record accurately reflects my personal performance of the history, physical exam, assessment, and plan.

## 2022-10-17 NOTE — PHYSICAL EXAM
[General Appearance - Well Developed] : well developed [General Appearance - Well Nourished] : well nourished [Normal Appearance] : normal appearance [Well Groomed] : well groomed [General Appearance - In No Acute Distress] : no acute distress [Abdomen Soft] : soft [Abdomen Tenderness] : non-tender [Abdomen Mass (___ Cm)] : no abdominal mass palpated [Abdomen Hernia] : no hernia was discovered [Costovertebral Angle Tenderness] : no ~M costovertebral angle tenderness [Urethral Meatus] : meatus normal [Penis Abnormality] : normal circumcised penis [Urinary Bladder Findings] : the bladder was normal on palpation [Scrotum] : the scrotum was normal [Testes Tenderness] : no tenderness of the testes [Testes Mass (___cm)] : there were no testicular masses [Prostate Tenderness] : the prostate was not tender [No Prostate Nodules] : no prostate nodules [Skin Color & Pigmentation] : normal skin color and pigmentation [Heart Rate And Rhythm] : Heart rate and rhythm were normal [] : no respiratory distress [Respiration, Rhythm And Depth] : normal respiratory rhythm and effort [Exaggerated Use Of Accessory Muscles For Inspiration] : no accessory muscle use [Oriented To Time, Place, And Person] : oriented to person, place, and time [Affect] : the affect was normal [Mood] : the mood was normal [Not Anxious] : not anxious [Normal Station and Gait] : the gait and station were normal for the patient's age [No Focal Deficits] : no focal deficits [No Palpable Adenopathy] : no palpable adenopathy [FreeTextEntry1] : Asymmetrical testes (left is approximately 5-10cc).  Tender, scarred left spermatic cord, consistent with prior surgery.

## 2022-10-17 NOTE — HISTORY OF PRESENT ILLNESS
[FreeTextEntry1] : Mr. ANALIA GORDILLO comes in today for a urologic followup.  \par \par From his general urologic history, he reports worsening urgency, and wears one protective pad when he goes out.  This is in addition to his chronic obstructive type voiding symptoms.  He awakens twice at night to urinate.\par He is continuing on alfuzosin and daily Cialis 5mg. He drinks 3 cups of coffee daily.\par IPSS: 21/35\par Sono (performed to assess bladder emptying): PVR 19cc, 89cc prostate; median lobe protrusion\par \par He has longstanding PSA elevations, with  3 negative prostate biopsies. Since the last biopsy in October 2021, he has had recurrent hematospermia. \par \par Mr. Gordillo has a several year onset of ED for which he uses Cialis 20mg prn.\par \par He was found to have urolithiasis on a routine CT, but has remained asymptomatic.\par \par PSAs:  1/28/09--1.76; 2/11/10--3.89;  11/20/10--5.1; 6/8/11--6.02; 7/11/12--5.5; 4/25/14--3.02; 4/13/15--3.85; 12/10/15--14.7; 1/19/17--8.05; 12/7/17--9.7; 5/19/18--8.9 (17%); 12/6/18--9.2 (15%); 1/17/20--14.8; 3/14/20--14.7; 6/24/20--12.7; 10/2/20--11.3 (PSAD:  0.17); 5/26/21--14.5; 2/14/22--17.5; 4/4/22--14.37; 9/15/22--18.95; \par \par Prostate bxs: 11/19/21--BPH; 2/19/16--BPH; 6/19/10--BPH \par \par MRI prostate: 10/8/21--PIRADS 2. 86cc prostate;  6/11/20--Normal study (65cc prostate); 12/22/17--PIRADS 2 \par \par CT abd/pelvis:  7/13/21--Punctate right renal calculus measuring 2mm. Hepatic cyst; 1/16/19--sigmoid diverticulosis\par Cysto: 2/1/19--No significant pathology

## 2022-10-17 NOTE — ASSESSMENT
[FreeTextEntry1] : I discussed the findings and options with Mr. ANALIA GORDILLO in detail.  A PSA is pending and we will call him with that result.  \par \par Regarding his voiding symptoms, I advised that he increase the alfuzosin to twice daily while maintaining the daily Cialis.  I counseled him regarding the need to monitor his blood pressure with a higher alpha-blocker dose.  He does not want to consider a 5 alpha reductase inhibitor or a anticholinergic/beta-agonist because of the potential side effects. Similarly he is not ready to pursue surgical options at this time.\par \par We also discussed behavior modifications, including restriction of caffeine intake to 1 cup daily.\par \par Mr. Gordillo will continue using the Cialis 20mg prn for his ED, which is well controlled.\par \par Providing the PSA is stable and there are no new problems, I look forward to seeing Mr. Gordillo in 6 months (bladder sono, PSA).

## 2022-10-18 LAB — PSA SERPL-MCNC: 16.9 NG/ML

## 2022-10-19 ENCOUNTER — NON-APPOINTMENT (OUTPATIENT)
Age: 67
End: 2022-10-19

## 2023-02-07 RX ORDER — ALFUZOSIN HYDROCHLORIDE 10 MG/1
10 TABLET, EXTENDED RELEASE ORAL DAILY
Qty: 180 | Refills: 3 | Status: ACTIVE | COMMUNITY
Start: 2023-02-07 | End: 1900-01-01

## 2023-12-01 ENCOUNTER — APPOINTMENT (OUTPATIENT)
Dept: UROLOGY | Facility: CLINIC | Age: 68
End: 2023-12-01
Payer: COMMERCIAL

## 2023-12-01 VITALS
WEIGHT: 185 LBS | OXYGEN SATURATION: 96 % | BODY MASS INDEX: 25.09 KG/M2 | HEART RATE: 99 BPM | SYSTOLIC BLOOD PRESSURE: 130 MMHG | DIASTOLIC BLOOD PRESSURE: 78 MMHG

## 2023-12-01 DIAGNOSIS — R36.1 HEMATOSPERMIA: ICD-10-CM

## 2023-12-01 PROCEDURE — 99215 OFFICE O/P EST HI 40 MIN: CPT | Mod: 25

## 2023-12-01 PROCEDURE — 51798 US URINE CAPACITY MEASURE: CPT

## 2023-12-02 LAB — PSA SERPL-MCNC: 24.1 NG/ML

## 2023-12-04 LAB
BACTERIA UR CULT: NORMAL
URINE CYTOLOGY: NORMAL

## 2023-12-07 ENCOUNTER — NON-APPOINTMENT (OUTPATIENT)
Age: 68
End: 2023-12-07

## 2023-12-20 ENCOUNTER — NON-APPOINTMENT (OUTPATIENT)
Age: 68
End: 2023-12-20

## 2024-01-11 PROBLEM — Z80.42 FAMILY HISTORY OF MALIGNANT NEOPLASM OF PROSTATE: Status: ACTIVE | Noted: 2019-12-04

## 2024-01-11 PROBLEM — Z87.898 HISTORY OF GROSS HEMATURIA: Status: RESOLVED | Noted: 2023-12-01 | Resolved: 2024-01-11

## 2024-01-12 ENCOUNTER — APPOINTMENT (OUTPATIENT)
Dept: UROLOGY | Facility: CLINIC | Age: 69
End: 2024-01-12
Payer: COMMERCIAL

## 2024-01-12 DIAGNOSIS — Z87.898 PERSONAL HISTORY OF OTHER SPECIFIED CONDITIONS: ICD-10-CM

## 2024-01-12 DIAGNOSIS — Z80.42 FAMILY HISTORY OF MALIGNANT NEOPLASM OF PROSTATE: ICD-10-CM

## 2024-01-12 PROCEDURE — 99214 OFFICE O/P EST MOD 30 MIN: CPT | Mod: 95

## 2024-01-12 NOTE — LETTER BODY
[Dear  ___] : Dear  [unfilled], [Consult Letter:] : I had the pleasure of evaluating your patient, [unfilled]. [Please see my note below.] : Please see my note below. [Consult Closing:] : Thank you very much for allowing me to participate in the care of this patient.  If you have any questions, please do not hesitate to contact me. [Sincerely,] : Sincerely, [FreeTextEntry3] : Miguel Elias MD, FACS

## 2024-01-12 NOTE — ASSESSMENT
[FreeTextEntry1] : I discussed the findings and options with Mr. ANALIA GORDILLO in detail and reviewed the MRI and CT scans.  Based on the MRI findings, further PSA elevation and abnormal PSA density, I advised that he consider a fourth biopsy with Dr. Patton.  He understands the pros and cons of this approach.  Apparently, the lung findings on the abdominal CT are not new as Mr. Gordillo states that these were previously evaluated by Dr. Snyder and that Dr. Bennett is aware.  Mr. Gordillo will continue on the 2 alfuzosin and daily Cialis; he does not want to start on an 5 alpha reductase inhibitor because of their potential side effects.  He will also continue on the Cialis 20 mg as needed for the erectile dysfunction.

## 2024-01-12 NOTE — HISTORY OF PRESENT ILLNESS
[FreeTextEntry1] : I contacted Mr. ANALIA GORDILLO by telemedicine using the [employer] platform. The patient was located at his home address in [place]. The appropriate consent was obtained prior to the conference.  The primary purpose of the meeting was to discuss his genitourinary issues.  Mr. Gordillo reports chronic moderate voiding symptoms (obstructive and irritative), with nocturia x2.  He has occasional urge incontinence and wears one protective pad when he goes out.   He is now on TWO alfuzosin and daily Cialis 5mg. He drinks 3 cups of coffee daily.  Mr. Gordillo has longstanding PSA elevations, with 3 negative prostate biopsies. Since the last biopsy in October 2021, he has had recurrent hematospermia.   In April 2023 he had two episodes of unprovoked gross painless hematuria, which have not recurred.  He has had 2 cystoscopies in the past and does not want to pursue this further.  Mr. Gordillo has a several year onset of ED for which he uses Cialis 20mg prn.  He was found to have urolithiasis on a routine CT but has remained asymptomatic.  PSAs:  1/28/09--1.76; 2/11/10--3.89;  11/20/10--5.1; 6/8/11--6.02; 7/11/12--5.5; 4/25/14--3.02; 4/13/15--3.85; 12/10/15--14.7; 1/19/17--8.05; 12/7/17--9.7; 5/19/18--8.9 (17%); 12/6/18--9.2 (15%); 5/25/19--9.1; 1/17/20--14.8; 3/14/20--14.7; 6/24/20--12.7; 10/2/20--11.3 (PSAD:  0.17); 12/28/20--15.4; 5/26/21--14.5; 2/14/22--17.5; 4/4/22--14.37; 9/15/22--18.95; 10/18/22--16.9; 12/2/23--24.1 (PSAD=0.25)  Prostate bxs: 11/19/21--BPH; 2/19/16--BPH; 6/19/10--BPH   MRI prostate: 12/29/23--10 mm left central zone PI-RADS 3 lesion which is new.  94 cc prostate; 10/8/21--PIRADS 2. 86cc prostate; 6/11/20--Normal study (65cc prostate); 12/22/17--PIRADS 2   CT abd/pelvis: 12/28/23-- No urolithiasis or solid masses.  There is a 0.9 cm nodule in the left lower lobe; this was not seen on prior examination*.  7/13/21--Punctate right renal calculus measuring 2mm. Hepatic cyst; 1/16/19--Sigmoid diverticulosis *He states that this was identified and followed up by Dr. Snyder.  Cysto: 7/15/21--Negative; 2/1/19--No significant pathology

## 2024-01-12 NOTE — PHYSICAL EXAM
[General Appearance - Well Developed] : well developed [General Appearance - Well Nourished] : well nourished [Normal Appearance] : normal appearance [Well Groomed] : well groomed [General Appearance - In No Acute Distress] : no acute distress [Abdomen Soft] : soft [Abdomen Tenderness] : non-tender [Abdomen Mass (___ Cm)] : no abdominal mass palpated [Abdomen Hernia] : no hernia was discovered [Costovertebral Angle Tenderness] : no ~M costovertebral angle tenderness [Urethral Meatus] : meatus normal [Penis Abnormality] : normal circumcised penis [Urinary Bladder Findings] : the bladder was normal on palpation [Scrotum] : the scrotum was normal [Testes Tenderness] : no tenderness of the testes [Testes Mass (___cm)] : there were no testicular masses [Prostate Tenderness] : the prostate was not tender [No Prostate Nodules] : no prostate nodules [Skin Color & Pigmentation] : normal skin color and pigmentation [Heart Rate And Rhythm] : Heart rate and rhythm were normal [] : no respiratory distress [Respiration, Rhythm And Depth] : normal respiratory rhythm and effort [Exaggerated Use Of Accessory Muscles For Inspiration] : no accessory muscle use [Oriented To Time, Place, And Person] : oriented to person, place, and time [Affect] : the affect was normal [Not Anxious] : not anxious [Normal Station and Gait] : the gait and station were normal for the patient's age [No Focal Deficits] : no focal deficits [No Palpable Adenopathy] : no palpable adenopathy [FreeTextEntry1] : Asymmetrical testes (left is approximately 5-10cc).  Tender, scarred left spermatic cord, consistent with prior surgery.  [Mood] : the mood was normal

## 2024-02-07 ENCOUNTER — APPOINTMENT (OUTPATIENT)
Dept: UROLOGY | Facility: CLINIC | Age: 69
End: 2024-02-07
Payer: COMMERCIAL

## 2024-02-07 VITALS
HEIGHT: 72 IN | SYSTOLIC BLOOD PRESSURE: 121 MMHG | DIASTOLIC BLOOD PRESSURE: 72 MMHG | HEART RATE: 83 BPM | WEIGHT: 185 LBS | TEMPERATURE: 97.6 F | BODY MASS INDEX: 25.06 KG/M2

## 2024-02-07 PROCEDURE — 99214 OFFICE O/P EST MOD 30 MIN: CPT

## 2024-02-07 NOTE — LETTER BODY
[Dear  ___] : Dear  [unfilled], [Courtesy Letter:] : I had the pleasure of seeing your patient, [unfilled], in my office today. [Please see my note below.] : Please see my note below. [Consult Closing:] : Thank you very much for allowing me to participate in the care of this patient.  If you have any questions, please do not hesitate to contact me. [Sincerely,] : Sincerely, [FreeTextEntry3] : Brock Patton MD

## 2024-02-07 NOTE — ASSESSMENT
[FreeTextEntry1] : 68 year old man with history of elevated PSA, most recent 24.1, MRI with new 1 cm PIRADS 3 lesion in the left central zone, 94 cc gland.  Prostate cancer screening: the patient and I spoke at length about prostate cancer screening, its risks and its benefits. He understands that many men with prostate cancer will die with the disease rather than of it and we also discussed the results large multi-center American and  prostate cancer screening trials. He also understands that PSA in and of itself does not diagnose prostate cancer but only assesses risk to a certain degree. The patient understands that to definitively screen for prostate cancer, a biopsy is required and this procedure has risks, including bleeding, infection, ED and urinary retention.  The patient opted to move forward with the biopsy, which is performed via a transperineal approach with MRI/US guided fusion targeting.  The patient is aware to expect hematuria x 2 weeks and up to 4 weeks of hematospermia.  There is a risk of infection albeit much lower than a transrectal approach. In some cases patients can experience erectile dysfunction but this is usually self limiting.  Any fever/chills after the biopsy the patient is to contact the office and go to the ER for an immediate evaluation. He has been given paper instructions outlining these items - which includes medications to avoid prior to surgery.  1. CBC, BMP, PSA, UA UCx 2. PCP Clearance 3. TP biopsy at [facility] 4. follow up 2 weeks after biopsy with his primary urologist or ourselves. 5. we will call with the path results once they are resulted.

## 2024-02-07 NOTE — HISTORY OF PRESENT ILLNESS
[FreeTextEntry1] : Mr. White has longstanding PSA elevations, with 3 negative prostate biopsies.   PSAs: 1/28/09--1.76; 2/11/10--3.89; 11/20/10--5.1; 6/8/11--6.02; 7/11/12--5.5; 4/25/14--3.02; 4/13/15--3.85; 12/10/15--14.7; 1/19/17--8.05; 12/7/17--9.7; 5/19/18--8.9 (17%); 12/6/18--9.2 (15%); 5/25/19--9.1; 1/17/20--14.8; 3/14/20--14.7; 6/24/20--12.7; 10/2/20--11.3 (PSAD: 0.17); 12/28/20--15.4; 5/26/21--14.5; 2/14/22--17.5; 4/4/22--14.37; 9/15/22--18.95; 10/18/22--16.9; 12/2/23--24.1 (PSAD=0.25)  Prostate bxs: 11/19/21--BPH; 2/19/16--BPH; 6/19/10--BPH  MRI prostate: 12/29/23--10 mm left central zone PI-RADS 3 lesion which is new. 94 cc prostate; 10/8/21--PIRADS 2. 86cc prostate; 6/11/20--Normal study (65cc prostate); 12/22/17--PIRADS 2  CT abd/pelvis: 12/28/23-- No urolithiasis or solid masses. There is a 0.9 cm nodule in the left lower lobe; this was not seen on prior examination*. 7/13/21--Punctate right renal calculus measuring 2mm. Hepatic cyst; 1/16/19--Sigmoid diverticulosis *He states that this was identified and followed up by Dr. Snyder.  Cysto: 7/15/21--Negative; 2/1/19--No significant pathology

## 2024-02-07 NOTE — PHYSICAL EXAM
[Normal Appearance] : normal appearance [Well Groomed] : well groomed [General Appearance - In No Acute Distress] : no acute distress [Edema] : no peripheral edema [Exaggerated Use Of Accessory Muscles For Inspiration] : no accessory muscle use [Respiration, Rhythm And Depth] : normal respiratory rhythm and effort [Abdomen Soft] : soft [Costovertebral Angle Tenderness] : no ~M costovertebral angle tenderness [Abdomen Tenderness] : non-tender [Urinary Bladder Findings] : the bladder was normal on palpation [Normal Station and Gait] : the gait and station were normal for the patient's age [] : no rash [No Focal Deficits] : no focal deficits [Oriented To Time, Place, And Person] : oriented to person, place, and time [Affect] : the affect was normal [Mood] : the mood was normal

## 2024-02-28 ENCOUNTER — OUTPATIENT (OUTPATIENT)
Dept: OUTPATIENT SERVICES | Facility: HOSPITAL | Age: 69
LOS: 1 days | End: 2024-02-28

## 2024-02-28 DIAGNOSIS — R97.20 ELEVATED PROSTATE SPECIFIC ANTIGEN [PSA]: ICD-10-CM

## 2024-02-28 PROCEDURE — C8001: CPT

## 2024-03-01 NOTE — ASU PATIENT PROFILE, ADULT - ANESTHESIA, PREVIOUS REACTION, PROFILE
Please call patient and see how she is doing from her buccal mucosal biopsies.  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    No treatment is recommended at this time.  Recommend that her dentist follow the lichenoid changes to her mucosa and refer back to me if they notice any significant change or if patient becomes symptomatic.  
Results sent via [employer] message.  
none

## 2024-03-01 NOTE — ASU PATIENT PROFILE, ADULT - NSICDXPASTMEDICALHX_GEN_ALL_CORE_FT
PAST MEDICAL HISTORY:  H/O urinary retention     Hyperlipidemia      PAST MEDICAL HISTORY:  Asthma     Elevated PSA     GERD (gastroesophageal reflux disease)     H/O urinary retention     History of glaucoma both eyes    Hyperlipidemia

## 2024-03-01 NOTE — ASU PATIENT PROFILE, ADULT - NSICDXPASTSURGICALHX_GEN_ALL_CORE_FT
PAST SURGICAL HISTORY:  H/O elbow surgery right    H/O hernia repair     H/O wrist surgery right    S/P right knee surgery

## 2024-03-01 NOTE — ASU PATIENT PROFILE, ADULT - NS PREOP UNDERSTANDS INFO
No solid food/dairy/candy/gum after midnight Sunday; water allowed before 09:00am Monday; patient reminded to come with photo ID/insurance/credit card; dress in comfortable clothes; no jewelries/contact lens/valuable; no smoking/alcohol drinking/recreational drug use Sunday; escort to have photo ID; address and callback number was given/yes

## 2024-03-01 NOTE — ASU PATIENT PROFILE, ADULT - FALL HARM RISK - UNIVERSAL INTERVENTIONS
Bed in lowest position, wheels locked, appropriate side rails in place/Call bell, personal items and telephone in reach/Instruct patient to call for assistance before getting out of bed or chair/Non-slip footwear when patient is out of bed/[place] to call system/Physically safe environment - no spills, clutter or unnecessary equipment/Purposeful Proactive Rounding/Room/bathroom lighting operational, light cord in reach

## 2024-03-04 ENCOUNTER — APPOINTMENT (OUTPATIENT)
Dept: UROLOGY | Facility: AMBULATORY SURGERY CENTER | Age: 69
End: 2024-03-04

## 2024-03-04 ENCOUNTER — TRANSCRIPTION ENCOUNTER (OUTPATIENT)
Age: 69
End: 2024-03-04

## 2024-03-04 ENCOUNTER — RESULT REVIEW (OUTPATIENT)
Age: 69
End: 2024-03-04

## 2024-03-04 ENCOUNTER — OUTPATIENT (OUTPATIENT)
Dept: OUTPATIENT SERVICES | Facility: HOSPITAL | Age: 69
LOS: 1 days | Discharge: HOME | End: 2024-03-04
Payer: COMMERCIAL

## 2024-03-04 VITALS
SYSTOLIC BLOOD PRESSURE: 132 MMHG | HEART RATE: 106 BPM | HEIGHT: 72 IN | TEMPERATURE: 98 F | DIASTOLIC BLOOD PRESSURE: 87 MMHG | WEIGHT: 191.14 LBS | RESPIRATION RATE: 16 BRPM | OXYGEN SATURATION: 96 %

## 2024-03-04 VITALS — HEART RATE: 101 BPM | OXYGEN SATURATION: 97 % | RESPIRATION RATE: 16 BRPM

## 2024-03-04 DIAGNOSIS — Z98.890 OTHER SPECIFIED POSTPROCEDURAL STATES: Chronic | ICD-10-CM

## 2024-03-04 PROCEDURE — 88344 IMHCHEM/IMCYTCHM EA MLT ANTB: CPT | Mod: 26

## 2024-03-04 PROCEDURE — 55700: CPT

## 2024-03-04 PROCEDURE — 76999F: CUSTOM | Mod: 26

## 2024-03-04 PROCEDURE — G0416: CPT | Mod: 26

## 2024-03-04 RX ORDER — ALFUZOSIN HYDROCHLORIDE 10 MG/1
1 TABLET, EXTENDED RELEASE ORAL
Refills: 0 | DISCHARGE

## 2024-03-04 RX ORDER — SODIUM CHLORIDE 9 MG/ML
1000 INJECTION, SOLUTION INTRAVENOUS ONCE
Refills: 0 | Status: DISCONTINUED | OUTPATIENT
Start: 2024-03-04 | End: 2024-03-04

## 2024-03-04 RX ORDER — ATORVASTATIN CALCIUM 80 MG/1
1 TABLET, FILM COATED ORAL
Refills: 0 | DISCHARGE

## 2024-03-04 RX ORDER — MONTELUKAST 4 MG/1
1 TABLET, CHEWABLE ORAL
Refills: 0 | DISCHARGE

## 2024-03-04 RX ORDER — ZOLPIDEM TARTRATE 10 MG/1
1 TABLET ORAL
Refills: 0 | DISCHARGE

## 2024-03-04 RX ORDER — LIDOCAINE HCL 20 MG/ML
10 VIAL (ML) INJECTION ONCE
Refills: 0 | Status: COMPLETED | OUTPATIENT
Start: 2024-03-04 | End: 2024-03-04

## 2024-03-04 RX ORDER — OMEPRAZOLE 10 MG/1
1 CAPSULE, DELAYED RELEASE ORAL
Refills: 0 | DISCHARGE

## 2024-03-04 RX ORDER — FENTANYL CITRATE 50 UG/ML
25 INJECTION INTRAVENOUS
Refills: 0 | Status: DISCONTINUED | OUTPATIENT
Start: 2024-03-04 | End: 2024-03-04

## 2024-03-04 RX ORDER — TADALAFIL 10 MG/1
1 TABLET, FILM COATED ORAL
Refills: 0 | DISCHARGE

## 2024-03-04 RX ORDER — ONDANSETRON 8 MG/1
4 TABLET, FILM COATED ORAL ONCE
Refills: 0 | Status: DISCONTINUED | OUTPATIENT
Start: 2024-03-04 | End: 2024-03-04

## 2024-03-04 RX ORDER — SODIUM CHLORIDE 9 MG/ML
1000 INJECTION, SOLUTION INTRAVENOUS
Refills: 0 | Status: DISCONTINUED | OUTPATIENT
Start: 2024-03-04 | End: 2024-03-04

## 2024-03-04 RX ADMIN — Medication 10 MILLILITER(S): at 20:29

## 2024-03-04 NOTE — BRIEF OPERATIVE NOTE - NSICDXBRIEFPROCEDURE_GEN_ALL_CORE_FT
PROCEDURES:  Biopsy of prostate by transperineal approach with integrated magnetic resonance-ultrasound guidance 04-Mar-2024 14:01:00  David Castro

## 2024-03-04 NOTE — ASU DISCHARGE PLAN (ADULT/PEDIATRIC) - ASU DC SPECIAL INSTRUCTIONSFT
PROSTATE BIOPSY    GENERAL: Expect blood in the urine, stool, and ejaculate for up to two (2) months postoperatively. This is normal and to be expected after this procedure. Increase hydration to keep the urine as clear as possible.    SURGICAL WOUND: There are often lumps and bumps that can be felt in the perineum (skin between scrotum and anus) for up to two (2) months or more post operatively. These are of no concern and with time they will soften and disappear.  Any “black and blue” bruising areas will also resolve.  You may apply an ice-pack for 15 minutes out of every hour for the first 24 -36 hours to minimize pain and swelling. You may apply over the counter Bacitracin to the wound several times a day, and keep covered with over the counter gauze as necessary.    PAIN: You may have some intermittent pain for up to six (6) weeks post operatively. Pain does not signify any problem unless associated with fever, chills, or inability to void.  If you experience any fevers or chills please call immediately as this may be signs of an infection. You may take Tylenol (acetaminophen) 650-975mg and/or Motrin (ibuprofen) 400-600mg, both available over the counter, for pain every 6 hours as needed. Do not exceed 4000mg of Tylenol (acetaminophen) daily. You may alternate these medications such that you take one or the other every 3 hours for around the clock pain coverage.     ANTICOAGULATION: If you are taking any blood thinning medications, please discuss with your urologist prior to restarting these medications unless otherwise specified.    BATHING: You may shower with soap and water, and pat dry the needle insertion sites in the perineum. Avoid sitting in water for prolonged periods of time for the next few days.    DIET: You may resume your regular diet and regular medication regimen.    ACTIVITY: No heavy lifting or strenuous exercise until you are evaluated at your post-operative appointment. Otherwise, you may return to your usual level of physical activity.    FOLLOW-UP: Please follow up with Dr. Patton. If you did not already schedule your post-operative appointment, please call your urologist to schedule and follow-up appointment.    CALL YOUR UROLOGIST IF: You have any bleeding that does not stop, inability to void >8 hours, fever over 100.4 F, chills, persistent nausea/vomiting, changes in your incision concerning for infection, or if your pain is not controlled on your discharge pain medications.

## 2024-03-04 NOTE — ASU DISCHARGE PLAN (ADULT/PEDIATRIC) - NS MD DC FALL RISK RISK
For information on Fall & Injury Prevention, visit: [email or web address] OR  [email or web address] OR  https://www.cdc.gov/steadi/patient.html

## 2024-03-04 NOTE — BRIEF OPERATIVE NOTE - OPERATION/FINDINGS
Transperineal fusion prostate biopsy. Please see operative dictation for full details of the case.

## 2024-03-04 NOTE — ASU DISCHARGE PLAN (ADULT/PEDIATRIC) - CARE PROVIDER_API CALL
Brock Patton  Urology  [address]  [place] [address]  Phone: [phone]  Fax: [phone]  Follow Up Time:

## 2024-03-05 ENCOUNTER — NON-APPOINTMENT (OUTPATIENT)
Age: 69
End: 2024-03-05

## 2024-03-05 ENCOUNTER — APPOINTMENT (OUTPATIENT)
Dept: UROLOGY | Facility: CLINIC | Age: 69
End: 2024-03-05
Payer: COMMERCIAL

## 2024-03-05 VITALS
SYSTOLIC BLOOD PRESSURE: 121 MMHG | OXYGEN SATURATION: 94 % | TEMPERATURE: 97.9 F | DIASTOLIC BLOOD PRESSURE: 73 MMHG | HEART RATE: 89 BPM

## 2024-03-05 PROBLEM — J45.909 UNSPECIFIED ASTHMA, UNCOMPLICATED: Chronic | Status: ACTIVE | Noted: 2024-03-04

## 2024-03-05 PROBLEM — K21.9 GASTRO-ESOPHAGEAL REFLUX DISEASE WITHOUT ESOPHAGITIS: Chronic | Status: ACTIVE | Noted: 2024-03-04

## 2024-03-05 PROBLEM — Z86.69 PERSONAL HISTORY OF OTHER DISEASES OF THE NERVOUS SYSTEM AND SENSE ORGANS: Chronic | Status: ACTIVE | Noted: 2024-03-04

## 2024-03-05 PROBLEM — R97.20 ELEVATED PROSTATE SPECIFIC ANTIGEN [PSA]: Chronic | Status: ACTIVE | Noted: 2024-03-04

## 2024-03-05 PROCEDURE — 99215 OFFICE O/P EST HI 40 MIN: CPT

## 2024-03-05 NOTE — HISTORY OF PRESENT ILLNESS
[FreeTextEntry1] : Mr. White has longstanding PSA elevations, with 3 negative prostate biopsies.  PSAs: 1/28/09--1.76; 2/11/10--3.89; 11/20/10--5.1; 6/8/11--6.02; 7/11/12--5.5; 4/25/14--3.02; 4/13/15--3.85; 12/10/15--14.7; 1/19/17--8.05; 12/7/17--9.7; 5/19/18--8.9 (17%); 12/6/18--9.2 (15%); 5/25/19--9.1; 1/17/20--14.8; 3/14/20--14.7; 6/24/20--12.7; 10/2/20--11.3 (PSAD: 0.17); 12/28/20--15.4; 5/26/21--14.5; 2/14/22--17.5; 4/4/22--14.37; 9/15/22--18.95; 10/18/22--16.9; 12/2/23--24.1 (PSAD=0.25)  Prostate bxs: 11/19/21--BPH; 2/19/16--BPH; 6/19/10--BPH  MRI prostate: 12/29/23--10 mm left central zone PI-RADS 3 lesion which is new. 94 cc prostate; 10/8/21--PIRADS 2. 86cc prostate; 6/11/20--Normal study (65cc prostate); 12/22/17--PIRADS 2  CT abd/pelvis: 12/28/23-- No urolithiasis or solid masses. There is a 0.9 cm nodule in the left lower lobe; this was not seen on prior examination*. 7/13/21--Punctate right renal calculus measuring 2mm. Hepatic cyst; 1/16/19--Sigmoid diverticulosis *He states that this was identified and followed up by Dr. Snyder.  Cysto: 7/15/21--Negative; 2/1/19--No significant pathology ************** 3/5/24: Patient is here for TOV today after episode of post-op urinary retention s/p TP biopsy.  Also wanted to discuss BPH management and procedures given chronic moderate voiding symptoms (obstructive and irritative), with nocturia x2. He has occasional urge incontinence and wears one protective pad when he goes out. He is now on TWO alfuzosin and daily Cialis 5mg. He drinks 3 cups of coffee daily.   TOV: - 150 cc sterile water instilled via existing catheter - Catheter removed in its entirety - Patient voided 150 pink urine

## 2024-03-05 NOTE — ASSESSMENT
[FreeTextEntry1] : 68 year old man with history of elevated PSA, most recent 24.1, MRI with new 1 cm PIRADS 3 lesion in the left central zone, 94 cc gland.  Patient here for TOV today due to post-op urinary retention s/p TP biopsy. Passed TOV today. RTC 1-2 weeks to discuss pathology.   He has chronic LUTS, taking alfuzosin x 2 and tadalafil. Urge incontinence requiring use of protective pad. Potentially interested in prostate debulking procedure if prostate biopsy is negative.  We discussed different therapeutic options including TURP, PVP, HoLEP, Aquablation, Urolift therapy, and Rezum in addition to full continuation of medical therapy. Discussed the issues of incontinence, retrograde ejaculation, erectile dysfunction, irritative voiding symptoms, injury to urethra and bladder, persistence of irritative voiding symptoms, urethral stricture or bladder neck contracture, need for open surgery to repair the injury, erectile dysfunction and infertility.

## 2024-03-11 ENCOUNTER — NON-APPOINTMENT (OUTPATIENT)
Age: 69
End: 2024-03-11

## 2024-03-11 LAB — SURGICAL PATHOLOGY STUDY: SIGNIFICANT CHANGE UP

## 2024-03-19 PROBLEM — Z87.898 PERSONAL HISTORY OF OTHER SPECIFIED CONDITIONS: Chronic | Status: ACTIVE | Noted: 2024-03-01

## 2024-03-19 PROBLEM — E78.5 HYPERLIPIDEMIA, UNSPECIFIED: Chronic | Status: ACTIVE | Noted: 2024-03-01

## 2024-05-29 PROBLEM — N52.01 ERECTILE DYSFUNCTION DUE TO ARTERIAL INSUFFICIENCY: Status: ACTIVE | Noted: 2020-01-17

## 2024-05-29 PROBLEM — Q55.9 TESTICULAR ASYMMETRY: Status: ACTIVE | Noted: 2020-07-06

## 2024-05-29 PROBLEM — N39.41 URGE INCONTINENCE: Status: ACTIVE | Noted: 2020-01-17

## 2024-05-29 PROBLEM — R33.9 URINARY RETENTION: Status: ACTIVE | Noted: 2023-02-07

## 2024-05-29 PROBLEM — N20.9 UROLITHIASIS: Status: ACTIVE | Noted: 2021-07-15

## 2024-05-29 PROBLEM — N40.0 BPH (BENIGN PROSTATIC HYPERPLASIA): Status: ACTIVE | Noted: 2020-01-09

## 2024-05-29 PROBLEM — R97.20 ELEVATED PSA: Status: ACTIVE | Noted: 2020-01-17

## 2024-05-31 ENCOUNTER — APPOINTMENT (OUTPATIENT)
Dept: UROLOGY | Facility: CLINIC | Age: 69
End: 2024-05-31
Payer: COMMERCIAL

## 2024-05-31 DIAGNOSIS — N40.0 BENIGN PROSTATIC HYPERPLASIA WITHOUT LOWER URINARY TRACT SYMPMS: ICD-10-CM

## 2024-05-31 DIAGNOSIS — N52.01 ERECTILE DYSFUNCTION DUE TO ARTERIAL INSUFFICIENCY: ICD-10-CM

## 2024-05-31 DIAGNOSIS — N43.40 SPERMATOCELE OF EPIDIDYMIS, UNSPECIFIED: ICD-10-CM

## 2024-05-31 DIAGNOSIS — Q55.9 CONGENITAL MALFORMATION OF MALE GENITAL ORGAN, UNSPECIFIED: ICD-10-CM

## 2024-05-31 DIAGNOSIS — R33.9 RETENTION OF URINE, UNSPECIFIED: ICD-10-CM

## 2024-05-31 DIAGNOSIS — R35.1 NOCTURIA: ICD-10-CM

## 2024-05-31 DIAGNOSIS — R97.20 ELEVATED PROSTATE, SPECIFIC ANTIGEN [PSA]: ICD-10-CM

## 2024-05-31 DIAGNOSIS — N20.9 URINARY CALCULUS, UNSPECIFIED: ICD-10-CM

## 2024-05-31 DIAGNOSIS — N39.41 URGE INCONTINENCE: ICD-10-CM

## 2024-05-31 PROCEDURE — 51798 US URINE CAPACITY MEASURE: CPT

## 2024-05-31 PROCEDURE — 99215 OFFICE O/P EST HI 40 MIN: CPT | Mod: 25

## 2024-05-31 RX ORDER — TADALAFIL 20 MG/1
20 TABLET ORAL
Qty: 7 | Refills: 11 | Status: ACTIVE | COMMUNITY
Start: 2020-12-28 | End: 1900-01-01

## 2024-05-31 RX ORDER — TADALAFIL 5 MG/1
5 TABLET ORAL
Qty: 90 | Refills: 3 | Status: ACTIVE | COMMUNITY
Start: 2020-09-22 | End: 1900-01-01

## 2024-05-31 RX ORDER — ALFUZOSIN HYDROCHLORIDE 10 MG/1
10 TABLET, EXTENDED RELEASE ORAL DAILY
Qty: 180 | Refills: 3 | Status: ACTIVE | COMMUNITY
Start: 2021-08-05 | End: 1900-01-01

## 2024-05-31 NOTE — PHYSICAL EXAM
[General Appearance - In No Acute Distress] : no acute distress [Oriented To Time, Place, And Person] : oriented to person, place, and time [Not Anxious] : not anxious [General Appearance - Well Developed] : well developed [General Appearance - Well Nourished] : well nourished [Normal Appearance] : normal appearance [Well Groomed] : well groomed [Edema] : no peripheral edema [Respiration, Rhythm And Depth] : normal respiratory rhythm and effort [Exaggerated Use Of Accessory Muscles For Inspiration] : no accessory muscle use [Abdomen Soft] : soft [Abdomen Tenderness] : non-tender [Costovertebral Angle Tenderness] : no ~M costovertebral angle tenderness [Urethral Meatus] : meatus normal [Urinary Bladder Findings] : the bladder was normal on palpation [Penis Abnormality] : normal circumcised penis [Testes Tenderness] : no tenderness of the testes [Testes Mass (___cm)] : there were no testicular masses [Prostate Tenderness] : the prostate was not tender [No Prostate Nodules] : no prostate nodules [Normal Station and Gait] : the gait and station were normal for the patient's age [] : no rash [No Focal Deficits] : no focal deficits [Affect] : the affect was normal [Mood] : the mood was normal [Inguinal Lymph Nodes Enlarged Bilaterally] : inguinal [de-identified] : left caput epididymal cyst [FreeTextEntry1] : Asymmetrical testes (left is approximately 5-10cc).  Tender, scarred left spermatic cord, consistent with prior surgery.

## 2024-05-31 NOTE — ASSESSMENT
[FreeTextEntry1] : I discussed the findings and options with . ANALIA GORDILLO in detail. He is essentially on maximal medication for his voiding symptoms (as he does not want to add a 5aRI) and would like to forego prostatic reductive surgery, understanding that he will likely need this in the future. He will continue on the 2 alfuzosin and dialy Cialis.  He will also continue on the Cialis 20 mg as needed for the erectile dysfunction.  He will have a PSA in 2-3 months and we will call him with that result.   Providing there are no new problems, Mr. Gordillo will followup in 6 months (bladder sono, PSA).

## 2024-05-31 NOTE — HISTORY OF PRESENT ILLNESS
[FreeTextEntry1] :  Mr. ANALIA GORDILLO returns for followup.  He reports chronic moderate voiding symptoms (obstructive and irritative), with nocturia x2.  He has occasional urge incontinence and wears one protective pad when he goes out.  He is now on TWO alfuzosin and daily Cialis 5mg. He drinks 3 cups of coffee daily.  He had an episode of urinary retention after his last prostate biopsy and transient difficulty urinating for a day or so after a colonoscopy earlier this week. IPSS: 22/4 Sono (performed to assess bladder emptying): 176cc PVR (146cc PVR, Dec 2023)  Mr. Gordillo has longstanding PSA elevations, with 4 negative prostate biopsies. Since the last biopsy in March 2024.  He experienced post-procedure urinary retention requiring short-term catheterization.  In April 2023 he had two episodes of unprovoked gross painless hematuria, which have not recurred.  He has had 2 cystoscopies in the past and does not want to pursue this further.  Mr. Gordillo has a several year onset of ED for which he uses Cialis 20mg prn.  He was found to have urolithiasis on a routine CT but has remained asymptomatic.  PSAs:  1/28/09--1.76; 2/11/10--3.89;  11/20/10--5.1; 6/8/11--6.02; 7/11/12--5.5; 4/25/14--3.02; 4/13/15--3.85; 12/10/15--14.7; 1/19/17--8.05; 12/7/17--9.7; 5/19/18--8.9 (17%); 12/6/18--9.2 (15%); 5/25/19--9.1; 1/17/20--14.8; 3/14/20--14.7; 6/24/20--12.7; 10/2/20--11.3 (PSAD:  0.17); 12/28/20--15.4; 5/26/21--14.5; 2/14/22--17.5; 4/4/22--14.37; 9/15/22--18.95; 10/18/22--16.9; 12/2/23--24.1 (PSAD=0.25)  Prostate bxs: 11/19/21--BPH; 2/19/16--BPH; 6/19/10--BPH   MRI prostate: 12/29/23--10 mm left central zone PI-RADS 3 lesion which is new.  94 cc prostate; 10/8/21--PIRADS 2. 86cc prostate; 6/11/20--Normal study (65cc prostate); 12/22/17--PIRADS 2   CT abd/pelvis: 12/28/23-- No urolithiasis or solid masses.  There is a 0.9 cm nodule in the left lower lobe; this was not seen on prior examination*.  7/13/21--Punctate right renal calculus measuring 2mm. Hepatic cyst; 1/16/19--Sigmoid diverticulosis *He states that this was identified and followed up by Dr. Snyder.  Cysto: 7/15/21--Negative; 2/1/19--No significant pathology

## 2024-12-03 NOTE — ASU DISCHARGE PLAN (ADULT/PEDIATRIC) - D. IF YOU HAD ANY TYPE OF SEDATION, YOU MAY EXPERIENCE LIGHTHEADEDNESS, DIZZINESS, OR SLEEPINESS FOLLOWING YOUR PROCEDURE. A RESPONSIBLE ADULT SHOULD STAY WITH YOU FOR AT LEAST 24 HOURS FOLLOWING YOUR PROCEDURE.
Render In Strict Bullet Format?: No Continue Regimen: permethrin 5% topical cream: Apply neck down to feet overnight x 8 hours. Wash off in AM. Repeat in 1 week. Detail Level: Zone Plan: Recommended to clean household items, clothing, and bedding. Pts son is recommended to see pediatrician for treatment Statement Selected

## 2024-12-09 ENCOUNTER — APPOINTMENT (OUTPATIENT)
Dept: UROLOGY | Facility: CLINIC | Age: 69
End: 2024-12-09
Payer: COMMERCIAL

## 2024-12-09 ENCOUNTER — APPOINTMENT (OUTPATIENT)
Dept: UROLOGY | Facility: CLINIC | Age: 69
End: 2024-12-09

## 2024-12-09 ENCOUNTER — NON-APPOINTMENT (OUTPATIENT)
Age: 69
End: 2024-12-09

## 2024-12-09 VITALS
SYSTOLIC BLOOD PRESSURE: 147 MMHG | HEIGHT: 72 IN | WEIGHT: 185 LBS | TEMPERATURE: 98.4 F | HEART RATE: 75 BPM | DIASTOLIC BLOOD PRESSURE: 86 MMHG | BODY MASS INDEX: 25.06 KG/M2

## 2024-12-09 DIAGNOSIS — R97.20 ELEVATED PROSTATE, SPECIFIC ANTIGEN [PSA]: ICD-10-CM

## 2024-12-09 DIAGNOSIS — N40.0 BENIGN PROSTATIC HYPERPLASIA WITHOUT LOWER URINARY TRACT SYMPMS: ICD-10-CM

## 2024-12-09 DIAGNOSIS — N52.01 ERECTILE DYSFUNCTION DUE TO ARTERIAL INSUFFICIENCY: ICD-10-CM

## 2024-12-09 PROCEDURE — 99214 OFFICE O/P EST MOD 30 MIN: CPT | Mod: 25

## 2024-12-09 PROCEDURE — G2211 COMPLEX E/M VISIT ADD ON: CPT | Mod: NC

## 2024-12-09 PROCEDURE — 51798 US URINE CAPACITY MEASURE: CPT

## 2024-12-10 LAB
APPEARANCE: CLEAR
BACTERIA: NEGATIVE /HPF
BILIRUBIN URINE: NEGATIVE
BLOOD URINE: NEGATIVE
CAST: 0 /LPF
COLOR: YELLOW
EPITHELIAL CELLS: 0 /HPF
GLUCOSE QUALITATIVE U: NEGATIVE MG/DL
KETONES URINE: NEGATIVE MG/DL
LEUKOCYTE ESTERASE URINE: NEGATIVE
MICROSCOPIC-UA: NORMAL
NITRITE URINE: NEGATIVE
PH URINE: 6.5
PROTEIN URINE: NEGATIVE MG/DL
RED BLOOD CELLS URINE: 1 /HPF
SPECIFIC GRAVITY URINE: 1.02
UROBILINOGEN URINE: 0.2 MG/DL
WHITE BLOOD CELLS URINE: 3 /HPF

## 2024-12-11 ENCOUNTER — NON-APPOINTMENT (OUTPATIENT)
Age: 69
End: 2024-12-11

## 2024-12-11 LAB
BACTERIA UR CULT: NORMAL
PSA FREE FLD-MCNC: 12 %
PSA FREE SERPL-MCNC: 2.9 NG/ML
PSA SERPL-MCNC: 24.7 NG/ML

## 2025-02-04 NOTE — PRE-ANESTHESIA EVALUATION ADULT - NSANTHNECKRD_ENT_A_CORE
"Palliative diagnoses: COPD, CHF  Outpatient Palliative provider: Known to palliative medicine through previous admissions.  No outpatient follow-up has been established    Goals of care  Level 1 code status  Disease focused care with no limits in place.   Concerns introduced today include:  Patient refuses to discuss goals of care and CODE STATUS today.  Says \"I will not think about those things.\"  Does not wish to proceed with family meeting.  Will continue discussions regarding GOC as patient's clinical presentation evolves.    Social support provided for send Daljit:  3 adult children support patient  Patient prefers only focus on positive things, family supports this  Supportive listening provided  Normalized experience of patient/family  Provided anxiety containment  Provided anticipatory guidance  Living situation -patient lives with his wife and 2 of his children, son Renny and daughter Rocio    Follow up  Palliative Care will continue to follow in hopes of building a trusting relationship with patient and having more effective GoC conversations in coming days.  Please reach out via "[employer] Secure Chat if questions or concerns arise.    I have reviewed the patient's controlled substance dispensing history in the Prescription Drug Monitoring Program in compliance with the [place] regulations before prescribing any controlled substances.  Last refills  No opioid or benzo refills in [place] over past year.    Decisional apparatus:  Patient lacks capacity based on chart review.  Patient's substitute decision maker would default to spouse Viridiana Zambrano by [place] Act 169.  Will need to investigate family dynamics at family meeting tomorrow as patient's spouse has a different last name than patient and children  Advance Directive/Living Will, POLST and POA Forms: None on file  ER contacts:  Name Relation Home Work Mobile   Viridiana Zambrano Spouse [phone]   Severiano,Julio Son   [phone]   Jim Patino " Grandparent   [phone]   Renny Fernandez Son [phone]   Rocio Fernandez Daughter   [phone]     We appreciate the invitation to be involved in this patient's care.  We will continue to follow throughout this hospitalization.  Please do not hesitate to reach our on call provider through our clinic answering service at [phone] should you have acute symptom control concerns.    Jennifer P. Bloch, MSN, CRNP, [facility]  Palliative and Supportive Care  Clinic/Answering Service: [phone]  You can find me on [employer] Secure Chat      No

## 2025-04-01 ENCOUNTER — APPOINTMENT (OUTPATIENT)
Dept: UROLOGY | Facility: CLINIC | Age: 70
End: 2025-04-01
Payer: COMMERCIAL

## 2025-04-01 VITALS
BODY MASS INDEX: 21.4 KG/M2 | WEIGHT: 185 LBS | HEART RATE: 88 BPM | SYSTOLIC BLOOD PRESSURE: 144 MMHG | DIASTOLIC BLOOD PRESSURE: 75 MMHG | TEMPERATURE: 95.5 F | HEIGHT: 78 IN

## 2025-04-01 DIAGNOSIS — N40.1 BENIGN PROSTATIC HYPERPLASIA WITH LOWER URINARY TRACT SYMPMS: ICD-10-CM

## 2025-04-01 DIAGNOSIS — R97.20 ELEVATED PROSTATE, SPECIFIC ANTIGEN [PSA]: ICD-10-CM

## 2025-04-01 DIAGNOSIS — R33.9 RETENTION OF URINE, UNSPECIFIED: ICD-10-CM

## 2025-04-01 DIAGNOSIS — N39.41 URGE INCONTINENCE: ICD-10-CM

## 2025-04-01 DIAGNOSIS — N13.8 BENIGN PROSTATIC HYPERPLASIA WITH LOWER URINARY TRACT SYMPMS: ICD-10-CM

## 2025-04-01 DIAGNOSIS — R35.1 NOCTURIA: ICD-10-CM

## 2025-04-01 PROCEDURE — 99214 OFFICE O/P EST MOD 30 MIN: CPT | Mod: 25

## 2025-04-01 PROCEDURE — 51798 US URINE CAPACITY MEASURE: CPT

## 2025-07-15 NOTE — ASU PATIENT PROFILE, ADULT - NSICDXPASTSURGICALHX_GEN_ALL_CORE_FT
PAST SURGICAL HISTORY:  H/O elbow surgery right    H/O hernia repair     H/O prostate biopsy     H/O wrist surgery right    S/P right knee surgery

## 2025-07-15 NOTE — ASU PATIENT PROFILE, ADULT - NSICDXPASTMEDICALHX_GEN_ALL_CORE_FT
PAST MEDICAL HISTORY:  Asthma     Elevated PSA     GERD (gastroesophageal reflux disease)     H/O urinary retention     History of glaucoma both eyes    Hyperlipidemia

## 2025-07-16 ENCOUNTER — TRANSCRIPTION ENCOUNTER (OUTPATIENT)
Age: 70
End: 2025-07-16

## 2025-07-16 ENCOUNTER — APPOINTMENT (OUTPATIENT)
Dept: UROLOGY | Facility: AMBULATORY SURGERY CENTER | Age: 70
End: 2025-07-16

## 2025-07-16 ENCOUNTER — OUTPATIENT (OUTPATIENT)
Dept: OUTPATIENT SERVICES | Facility: HOSPITAL | Age: 70
LOS: 1 days | Discharge: HOME | End: 2025-07-16
Payer: COMMERCIAL

## 2025-07-16 ENCOUNTER — RESULT REVIEW (OUTPATIENT)
Age: 70
End: 2025-07-16

## 2025-07-16 VITALS
TEMPERATURE: 98 F | OXYGEN SATURATION: 96 % | HEART RATE: 78 BPM | HEIGHT: 72 IN | RESPIRATION RATE: 16 BRPM | SYSTOLIC BLOOD PRESSURE: 135 MMHG | DIASTOLIC BLOOD PRESSURE: 86 MMHG | WEIGHT: 185.85 LBS

## 2025-07-16 VITALS
TEMPERATURE: 99 F | SYSTOLIC BLOOD PRESSURE: 135 MMHG | RESPIRATION RATE: 16 BRPM | HEART RATE: 63 BPM | DIASTOLIC BLOOD PRESSURE: 83 MMHG | OXYGEN SATURATION: 99 %

## 2025-07-16 DIAGNOSIS — Z98.890 OTHER SPECIFIED POSTPROCEDURAL STATES: Chronic | ICD-10-CM

## 2025-07-16 PROCEDURE — 52601 PROSTATECTOMY (TURP): CPT

## 2025-07-16 PROCEDURE — 88305 TISSUE EXAM BY PATHOLOGIST: CPT | Mod: 26

## 2025-07-16 RX ORDER — SODIUM CHLORIDE 9 G/1000ML
1000 INJECTION, SOLUTION INTRAVENOUS
Refills: 0 | Status: DISCONTINUED | OUTPATIENT
Start: 2025-07-16 | End: 2025-07-16

## 2025-07-16 RX ORDER — SULFAMETHOXAZOLE/TRIMETHOPRIM 800-160 MG
1 TABLET ORAL
Qty: 4 | Refills: 0
Start: 2025-07-16 | End: 2025-07-17

## 2025-07-16 RX ORDER — TAMSULOSIN HYDROCHLORIDE 0.4 MG/1
0.4 CAPSULE ORAL ONCE
Refills: 0 | Status: COMPLETED | OUTPATIENT
Start: 2025-07-16 | End: 2025-07-16

## 2025-07-16 RX ORDER — DIAZEPAM 5 MG/1
1 TABLET ORAL
Qty: 9 | Refills: 0
Start: 2025-07-16 | End: 2025-07-18

## 2025-07-16 RX ORDER — DIAZEPAM 5 MG/1
5 TABLET ORAL ONCE
Refills: 0 | Status: COMPLETED | OUTPATIENT
Start: 2025-07-16 | End: 2025-07-23

## 2025-07-16 RX ORDER — FENTANYL CITRATE-0.9 % NACL/PF 100MCG/2ML
25 SYRINGE (ML) INTRAVENOUS
Refills: 0 | Status: DISCONTINUED | OUTPATIENT
Start: 2025-07-16 | End: 2025-07-16

## 2025-07-16 RX ORDER — TAMSULOSIN HYDROCHLORIDE 0.4 MG/1
0.4 CAPSULE ORAL ONCE
Refills: 0 | Status: COMPLETED | OUTPATIENT
Start: 2025-07-16 | End: 2026-06-14

## 2025-07-16 RX ORDER — DIAZEPAM 5 MG/1
5 TABLET ORAL ONCE
Refills: 0 | Status: DISCONTINUED | OUTPATIENT
Start: 2025-07-16 | End: 2025-07-16

## 2025-07-16 RX ORDER — CEFPODOXIME PROXETIL 200 MG/1
1 TABLET, FILM COATED ORAL
Refills: 0 | DISCHARGE

## 2025-07-16 RX ORDER — ACETAMINOPHEN 500 MG/5ML
1000 LIQUID (ML) ORAL ONCE
Refills: 0 | Status: COMPLETED | OUTPATIENT
Start: 2025-07-16 | End: 2025-07-16

## 2025-07-16 RX ADMIN — TAMSULOSIN HYDROCHLORIDE 0.4 MILLIGRAM(S): 0.4 CAPSULE ORAL at 10:47

## 2025-07-16 RX ADMIN — DIAZEPAM 5 MILLIGRAM(S): 5 TABLET ORAL at 10:48

## 2025-07-16 RX ADMIN — Medication 1000 MILLIGRAM(S): at 06:42

## 2025-07-16 RX ADMIN — Medication 25 MICROGRAM(S): at 11:09

## 2025-07-16 NOTE — PACU DISCHARGE NOTE - HYDRATION STATUS:
Received information from [employer] that the pt has a $1,000 deductible and it may be cheaper to send the Rxs for her FreeStyle Joseph and supplies sent to her pharmacy. Left vm for pt to let us know if she would like it sent to her pharmacy instead.  
Satisfactory

## 2025-07-16 NOTE — ASU DISCHARGE PLAN (ADULT/PEDIATRIC) - CARE PROVIDER_API CALL
Trey Bains)  Urology  [address]  [place] [address]  Phone: [phone]  Fax: [phone]  Follow Up Time:

## 2025-07-16 NOTE — PRE-ANESTHESIA EVALUATION ADULT - NSANTHDISPORD_GEN_ALL_CORE
Subjective     Fever  Max temp prior to arrival:  101F  Temp source:  Oral  Severity:  Mild  Onset quality:  Gradual  Duration:  1 day  Timing:  Constant  Progression:  Waxing and waning  Chronicity:  New  Relieved by:  Nothing  Worsened by:  Nothing  Ineffective treatments:  None tried  Associated symptoms: no congestion, no cough, no diarrhea, no feeding intolerance, no fussiness, no rash, no rhinorrhea, no tugging at ears and no vomiting        Review of Systems   Constitutional: Positive for fever.   HENT: Negative for congestion and rhinorrhea.    Respiratory: Negative for cough.    Gastrointestinal: Negative for diarrhea and vomiting.   Skin: Negative for rash.   All other systems reviewed and are negative.      History reviewed. No pertinent past medical history.    No Known Allergies    History reviewed. No pertinent surgical history.    History reviewed. No pertinent family history.    Social History     Socioeconomic History   • Marital status: Single   Tobacco Use   • Smoking status: Never Smoker           Objective   Physical Exam  Vitals and nursing note reviewed.   Constitutional:       General: She is irritable.      Comments: Well-appearing   HENT:      Head: Normocephalic and atraumatic. Anterior fontanelle is flat.      Right Ear: Tympanic membrane, ear canal and external ear normal.      Left Ear: Tympanic membrane, ear canal and external ear normal.      Nose: Nose normal.      Mouth/Throat:      Mouth: Mucous membranes are moist.      Pharynx: Oropharynx is clear.   Eyes:      General:         Right eye: No discharge.         Left eye: No discharge.   Cardiovascular:      Rate and Rhythm: Normal rate and regular rhythm.   Pulmonary:      Effort: Pulmonary effort is normal.      Breath sounds: Normal breath sounds.   Abdominal:      General: Abdomen is flat.      Tenderness: There is no abdominal tenderness.   Musculoskeletal:         General: No signs of injury.   Skin:     General: Skin is warm  and dry.      Turgor: Normal.   Neurological:      General: No focal deficit present.      Mental Status: She is alert.         Procedures           ED Course                                                 MDM  Number of Diagnoses or Management Options     Amount and/or Complexity of Data Reviewed  Clinical lab tests: reviewed  Decide to obtain previous medical records or to obtain history from someone other than the patient: yes  Obtain history from someone other than the patient: yes  Review and summarize past medical records: yes      This is a typically well 4-month-old female with fever and no other associated symptoms or physical exam findings.  Will obtain viral swabs and a catheterized urine specimen and reevaluate.  Final diagnoses:   Urinary tract infection without hematuria, site unspecified       ED Disposition  ED Disposition     ED Disposition Condition Comment    Discharge Stable           [facility] EMERGENCY ROOM  [address] [facility] [address] [phone]    As needed, If symptoms worsen at any time         Medication List      New Prescriptions    acetaminophen 160 MG/5ML suspension  Commonly known as: TYLENOL  Take 3.2 mL by mouth Every 4 (Four) Hours As Needed for Mild Pain  or Fever.     cefdinir 125 MG/5ML suspension  Commonly known as: OMNICEF  Take 1.9 mL by mouth 2 (Two) Times a Day for 10 days.           Where to Get Your Medications      These medications were sent to [facility]/pharmacy #[id number] - [place], [place] - [address] - [phone]  - [phone] FX  [address]  [place] [address]    Hours: 24-hours Phone: [phone]   · acetaminophen 160 MG/5ML suspension  · cefdinir 125 MG/5ML suspension          Damien Soliman DO  02/02/22 0201     PACU

## 2025-07-16 NOTE — ASU DISCHARGE PLAN (ADULT/PEDIATRIC) - NS MD DC FALL RISK RISK
For information on Fall & Injury Prevention, visit: [email or web address] OR  [email or web address] OR  https://www.cdc.gov/steadi/patient.html

## 2025-07-16 NOTE — ASU DISCHARGE PLAN (ADULT/PEDIATRIC) - ASU DC SPECIAL INSTRUCTIONSFT
TRANSURETHRAL RESECTION OF PROSTATE    GENERAL: It is common to have blood in your urine after your procedure.  It may be pink or even red; and it is important to increase fluid intake to 2-3L of water per day to keep the urine as clear as possible. Please inform your doctor if you have a significant amount of clot in the urine or if you are unable to void at all.  The urine may clear and then become bloody again especially as you are more physically active. It is not uncommon to have some burning when you urinate, this will gradually improve. With a catheter in place, it is not uncommon to have occasional leakage or urine or blood around the catheter. Please call your urologist if this is excessive and/or the urine is not draining through the catheter into the bag.    CATHETER: Most patients are sent home with a Faith catheter, which continuously drains the urine from the bladder. If you still have a catheter, the nurses will review instructions and care before you go home. For men, you may have a prescription for lidocaine jelly to apply to the tip of your penis, as needed, for catheter related discomfort.    UROLOGIC MEDICATIONS:  The following medications may have been sent to your pharmacy for stent related discomfort: Flomax (tamsulosin) 0.4mg at bedtime until stent removed, Ditropan (oxybutynin) 5mg every 8 hours as needed for bladder spasms, and Pyridium (phenazopyridine) 100mg every 8 hours as needed for kidney/bladder discomfort for max 3 days (Pyridium will make your urine orange). For your convenience, all prescriptions are sent to [facility] pharmacy at [address] [place] [address], on the corner of [address] and [address] [place] [address].    PAIN: You may take Tylenol (acetaminophen) 650-975mg and/or Motrin (ibuprofen) 400-600mg, both available over the counter, for pain every 6 hours as needed. Do not exceed 4000mg of Tylenol (acetaminophen) daily. You may alternate these medications such that you take one or the other every 3 hours for around the clock pain coverage.    ANTIBIOTICS: You may be given a prescription for an antibiotic, please take this medication as instructed and be sure to complete the entire course. For your convenience, all prescriptions are sent to [facility] pharmacy at [address] [place] [address], on the corner of [address] and [address] [place] [address].    STOOL SOFTENERS: Do not allow yourself to become constipated as straining may cause bleeding. Take stool softeners or a laxative (ex. Miralax, Colace, Senokot, ExLax, etc), available over the counter, if needed.    ANTICOAGULATION: If you are taking any blood thinning medications, please discuss with your urologist prior to restarting these medications unless otherwise specified.    BATHING: You may shower or bathe. If going home with faith, shower only until catheter is removed.    DIET: You may resume your regular diet and regular medication regimen.    ACTIVITY: No heavy lifting or strenuous exercise until you are evaluated at your post-operative appointment. Otherwise, you may return to your usual level of physical activity.    FOLLOW-UP: If you did not already schedule your post-operative appointment, please call your urologist to schedule and follow-up appointment.    CALL YOUR UROLOGIST IF: You have any bleeding that does not stop, inability to void >8 hours, fever over 100.4 F, chills, persistent nausea/vomiting, changes in your incision concerning for infection, or if your pain is not controlled on your discharge pain medications. TRANSURETHRAL RESECTION OF PROSTATE    GENERAL: It is common to have blood in your urine after your procedure.  It may be pink or even red; and it is important to increase fluid intake to 2-3L of water per day to keep the urine as clear as possible. Please inform your doctor if you have a significant amount of clot in the urine or if you are unable to void at all.  The urine may clear and then become bloody again especially as you are more physically active. It is not uncommon to have some burning when you urinate, this will gradually improve. With a catheter in place, it is not uncommon to have occasional leakage or urine or blood around the catheter. Please call your urologist if this is excessive and/or the urine is not draining through the catheter into the bag.    CATHETER: Most patients are sent home with a Faith catheter, which continuously drains the urine from the bladder. If you still have a catheter, the nurses will review instructions and care before you go home.     UROLOGIC MEDICATIONS:  The following medications may have been sent to your pharmacy for stent related discomfort: Flomax (tamsulosin) 0.4mg at bedtime until stent removed, Valium 5mg every 8 hours as needed for bladder spasms.    PAIN: You may take Tylenol (acetaminophen) 650-975mg and/or Motrin (ibuprofen) 400-600mg, both available over the counter, for pain every 6 hours as needed. Do not exceed 4000mg of Tylenol (acetaminophen) daily. You may alternate these medications such that you take one or the other every 3 hours for around the clock pain coverage.    ANTIBIOTICS: You have been given a prescription for an antibiotic, please take this medication as instructed and be sure to complete the entire course.     STOOL SOFTENERS: Do not allow yourself to become constipated as straining may cause bleeding. Take stool softeners or a laxative (ex. Miralax, Colace, Senokot, ExLax, etc), available over the counter, if needed.    BATHING: You may shower or bathe. If going home with faith, shower only until catheter is removed.    DIET: You may resume your regular diet and regular medication regimen.    ACTIVITY: No heavy lifting or strenuous exercise until you are evaluated at your post-operative appointment. Otherwise, you may return to your usual level of physical activity.    FOLLOW-UP: If you did not already schedule your post-operative appointment, please call your urologist to schedule and follow-up appointment.    CALL YOUR UROLOGIST IF: You have any bleeding that does not stop, inability to void >8 hours, fever over 100.4 F, chills, persistent nausea/vomiting, changes in your incision concerning for infection, or if your pain is not controlled on your discharge pain medications.

## 2025-07-16 NOTE — ASU DISCHARGE PLAN (ADULT/PEDIATRIC) - FINANCIAL ASSISTANCE
[facility] provides services at a reduced cost to those who are determined to be eligible through [facility]’s financial assistance program. Information regarding [facility]’s financial assistance program can be found by going to [email or web address] or by calling [phone].

## 2025-07-16 NOTE — BRIEF OPERATIVE NOTE - NSICDXBRIEFPREOP_GEN_ALL_CORE_FT
PRE-OP DIAGNOSIS:  BPH with urinary obstruction 16-Jul-2025 10:12:29  Wilbert Gruber  
Detail Level: Detailed

## 2025-07-18 ENCOUNTER — APPOINTMENT (OUTPATIENT)
Dept: UROLOGY | Facility: CLINIC | Age: 70
End: 2025-07-18
Payer: COMMERCIAL

## 2025-07-18 VITALS
HEART RATE: 86 BPM | HEIGHT: 78 IN | SYSTOLIC BLOOD PRESSURE: 143 MMHG | TEMPERATURE: 98.1 F | DIASTOLIC BLOOD PRESSURE: 69 MMHG | WEIGHT: 185 LBS | BODY MASS INDEX: 21.4 KG/M2

## 2025-07-18 PROCEDURE — 51798 US URINE CAPACITY MEASURE: CPT

## 2025-07-18 PROCEDURE — 99024 POSTOP FOLLOW-UP VISIT: CPT

## 2025-08-04 ENCOUNTER — NON-APPOINTMENT (OUTPATIENT)
Age: 70
End: 2025-08-04

## 2025-08-05 ENCOUNTER — APPOINTMENT (OUTPATIENT)
Dept: UROLOGY | Facility: CLINIC | Age: 70
End: 2025-08-05
Payer: COMMERCIAL

## 2025-08-05 VITALS
TEMPERATURE: 97.7 F | HEART RATE: 72 BPM | WEIGHT: 185 LBS | DIASTOLIC BLOOD PRESSURE: 54 MMHG | SYSTOLIC BLOOD PRESSURE: 116 MMHG | BODY MASS INDEX: 21.4 KG/M2 | HEIGHT: 78 IN

## 2025-08-05 DIAGNOSIS — R33.9 RETENTION OF URINE, UNSPECIFIED: ICD-10-CM

## 2025-08-05 DIAGNOSIS — N39.41 URGE INCONTINENCE: ICD-10-CM

## 2025-08-05 DIAGNOSIS — N40.1 BENIGN PROSTATIC HYPERPLASIA WITH LOWER URINARY TRACT SYMPMS: ICD-10-CM

## 2025-08-05 DIAGNOSIS — N13.8 BENIGN PROSTATIC HYPERPLASIA WITH LOWER URINARY TRACT SYMPMS: ICD-10-CM

## 2025-08-05 PROCEDURE — 99024 POSTOP FOLLOW-UP VISIT: CPT

## 2025-08-05 PROCEDURE — 51798 US URINE CAPACITY MEASURE: CPT

## (undated) DEVICE — UROVAC

## (undated) DEVICE — SYR CATH TIP 2 OZ

## (undated) DEVICE — DRSG TELFA 3 X 8

## (undated) DEVICE — FOLEY CATH 3-WAY 22FR 30CC LATEX HEMATURIA

## (undated) DEVICE — TUBING RANGER FLUID IRRIGATION SET DISP

## (undated) DEVICE — Device

## (undated) DEVICE — DRAPE TOWEL BLUE 17" X 24"

## (undated) DEVICE — NDL MAX CORE 18G X 25CM

## (undated) DEVICE — ELCTR PLASMA LOOP MEDIUM ANGLED 24FR 12-30 DEG

## (undated) DEVICE — WARMING BLANKET UPPER ADULT

## (undated) DEVICE — BALLOON ENDOCAVITY 2X14CM

## (undated) DEVICE — SOL IRR BAG NS 0.9% 3000ML

## (undated) DEVICE — POSITIONER FOAM EGG CRATE ULNAR 2PCS (PINK)

## (undated) DEVICE — GLV 7.5 PROTEXIS (WHITE)

## (undated) DEVICE — SYR LUER LOK 10CC

## (undated) DEVICE — NDL BIOPSY CHIBA 22G X 20CM

## (undated) DEVICE — FOLEY CATH 3-WAY 20FR 30CC LATEX HEMATURIA

## (undated) DEVICE — FOLEY CATH 3-WAY 22FR 30CC LATEX HEMATURIA COUDE

## (undated) DEVICE — GRID BRACHYTHERAPY EZ 18G

## (undated) DEVICE — PREP CHLORAPREP HI-LITE ORANGE 26ML

## (undated) DEVICE — FOLEY CATH 3-WAY 22FR 30CC LATEX LUBRICATH

## (undated) DEVICE — PREP BETADINE KIT

## (undated) DEVICE — NDL HYPO SAFE 18G X 1.5" (PINK)

## (undated) DEVICE — VENODYNE/SCD SLEEVE CALF MEDIUM

## (undated) DEVICE — DRAPE MEDIUM SHEET 44" X 70"

## (undated) DEVICE — SYR LUER LOK 50CC

## (undated) DEVICE — NDL HYPO REGULAR BEVEL 25G X 1.5" (BLUE)

## (undated) DEVICE — ELCTR PLASMA BUTTON OVAL 24FR 12-30 DEG

## (undated) DEVICE — PACK CYSTO

## (undated) DEVICE — DRAINAGE BAG URINARY 4L

## (undated) DEVICE — PLASTIC SOLUTION BOWL 160Z

## (undated) DEVICE — TUBING SUCTION NONCONDUCTIVE 6MM X 12FT